# Patient Record
Sex: FEMALE | Race: BLACK OR AFRICAN AMERICAN | Employment: FULL TIME | URBAN - METROPOLITAN AREA
[De-identification: names, ages, dates, MRNs, and addresses within clinical notes are randomized per-mention and may not be internally consistent; named-entity substitution may affect disease eponyms.]

---

## 2017-11-10 ENCOUNTER — HOSPITAL ENCOUNTER (OUTPATIENT)
Dept: ULTRASOUND IMAGING | Age: 47
Discharge: HOME | End: 2017-11-10
Attending: FAMILY MEDICINE
Payer: COMMERCIAL

## 2017-11-10 DIAGNOSIS — R09.89 BRUIT OF LEFT CAROTID ARTERY: ICD-10-CM

## 2017-11-10 DIAGNOSIS — R09.89 ABNORMAL CHEST SOUNDS: ICD-10-CM

## 2017-11-10 PROCEDURE — 93880 EXTRACRANIAL BILAT STUDY: CPT

## 2017-11-18 ENCOUNTER — HOSPITAL ENCOUNTER (OUTPATIENT)
Dept: MRI IMAGING | Age: 47
Discharge: HOME | End: 2017-11-18
Attending: OTOLARYNGOLOGY
Payer: COMMERCIAL

## 2017-11-18 DIAGNOSIS — H93.A2 PULSATILE TINNITUS, LEFT EAR: ICD-10-CM

## 2017-11-18 PROCEDURE — 70553 MRI BRAIN STEM W/O & W/DYE: CPT

## 2017-11-18 PROCEDURE — A9576 INJ PROHANCE MULTIPACK: HCPCS | Performed by: OTOLARYNGOLOGY

## 2017-11-18 PROCEDURE — 74011250636 HC RX REV CODE- 250/636: Performed by: OTOLARYNGOLOGY

## 2017-11-18 RX ADMIN — GADOTERIDOL 20 ML: 279.3 INJECTION, SOLUTION INTRAVENOUS at 13:00

## 2019-05-03 ENCOUNTER — OFFICE VISIT (OUTPATIENT)
Dept: NEUROLOGY | Age: 49
End: 2019-05-03

## 2019-05-03 VITALS
SYSTOLIC BLOOD PRESSURE: 138 MMHG | RESPIRATION RATE: 18 BRPM | WEIGHT: 175 LBS | OXYGEN SATURATION: 99 % | DIASTOLIC BLOOD PRESSURE: 86 MMHG | HEART RATE: 65 BPM

## 2019-05-03 DIAGNOSIS — E03.9 HYPOTHYROIDISM, UNSPECIFIED TYPE: ICD-10-CM

## 2019-05-03 DIAGNOSIS — R53.83 FATIGUE, UNSPECIFIED TYPE: ICD-10-CM

## 2019-05-03 DIAGNOSIS — R51.9 NONINTRACTABLE EPISODIC HEADACHE, UNSPECIFIED HEADACHE TYPE: ICD-10-CM

## 2019-05-03 DIAGNOSIS — R20.2 PARESTHESIA: Primary | ICD-10-CM

## 2019-05-03 RX ORDER — LEVOTHYROXINE SODIUM 125 UG/1
TABLET ORAL
COMMUNITY
End: 2022-09-05 | Stop reason: DRUGHIGH

## 2019-05-03 NOTE — PROGRESS NOTES
Neurology Consult Note      HISTORY PROVIDED BY: patient and     Chief Complaint:   Chief Complaint   Patient presents with    Neurologic Problem      Subjective:    Billy Ross is a 51 y.o. right handed female who presents in consultation for paresthesias. Pt reports onset one week ago of pins and needles in right arm mostly, occasionally left arm. This occurs randomly in different spots on arms, not entire arm, intermittent. When questioned, states she has a little neck pain. Denies any inciting events. Then 4 days ago began having pins and needles in all extremities, again intermittent in various spots, not on trunk. Did start working out with a punching bag that day, but doesn't feel that this is cause. Additionally, began having pain in back of head one month ago, now global. She went to [facility] ED at onset and had head CT that patient reports was normal. No prior h/o HAs. Occurring daily, intermittent, brief stabbing pain, not taking anything for it now, given pain med in ED. She has tinnitus in left ear, not pulsatile, was seen at [facility] and no cause found. She is feeling more fatigued recently. Sleeping well, but does not feel refreshed on awakening, no snoring, sleeps through the night. She has not seen PCP for these issues yet. Note from referring provider reviewed: Adriana Montoya, MAIA at [facility], [address] [place] [address] Rd 5/2/19 -complained of pins-and-needles sensation to both arms from the shoulder fingers as well as both legs to feet, noted legs worse when lying down. She reported having sensations in the past with it, but have become noticeable again particularly over the last week. Mentions being seen in the emergency department March 2019 for pains to the back of the head diagnosed with muscle spasms. Had negative head CT. Mentions she was seen at this clinic in 2016 with similar concerns not responding to diclofenac.   Also mentions seeing Dr. Werner Bernard in ENT for left ear tinnitus with negative CT and MRI. Also mentions carotid plaque on Dopplers in 2017 prescribed pravastatin but not currently taking. Most recent labs were from 2017. They recommend a neurological evaluation. Past Medical History:   Diagnosis Date    Hypothyroid       History reviewed. No pertinent surgical history.    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: Not on file    Food insecurity:     Worry: Not on file     Inability: Not on file   Cytoguide needs:     Medical: Not on file     Non-medical: Not on file   Tobacco Use    Smoking status: Never Smoker    Smokeless tobacco: Never Used   Substance and Sexual Activity    Alcohol use: Never     Frequency: Never    Drug use: Never    Sexual activity: Not on file   Lifestyle    Physical activity:     Days per week: Not on file     Minutes per session: Not on file    Stress: Not on file   Relationships    Social connections:     Talks on phone: Not on file     Gets together: Not on file     Attends Latter-day service: Not on file     Active member of club or organization: Not on file     Attends meetings of clubs or organizations: Not on file     Relationship status: Not on file    Intimate partner violence:     Fear of current or ex partner: Not on file     Emotionally abused: Not on file     Physically abused: Not on file     Forced sexual activity: Not on file   Other Topics Concern    Not on file   Social History Narrative    , lives in [place] with      Family History   Problem Relation Age of Onset    High Cholesterol Mother     Hypertension Father     No Known Problems Sister     No Known Problems Daughter     No Known Problems Daughter Objective:   Review of Systems   Constitutional: Positive for malaise/fatigue. HENT: Positive for tinnitus. Eyes: Negative. Respiratory: Negative. Cardiovascular: Negative. Gastrointestinal: Negative. Genitourinary: Negative. Musculoskeletal: Negative. Skin: Negative. Neurological: Negative. Endo/Heme/Allergies: Negative. Psychiatric/Behavioral: Negative. No Known Allergies     Meds:  Outpatient Medications Prior to Visit   Medication Sig Dispense Refill    levothyroxine (SYNTHROID) 125 mcg tablet Take  by mouth Daily (before breakfast). No facility-administered medications prior to visit. Imaging:  MRI Results (most recent):  No results found for this or any previous visit. CT Results (most recent):  No results found for this or any previous visit. Reviewed records in [email or web address] and [facility] tab today    Lab Review   No results found for this or any previous visit. Exam:  Visit Vitals  /86   Pulse 65   Resp 18   Wt 79.4 kg (175 lb)   SpO2 99%     General:  Alert, cooperative, no distress. Head:  Normocephalic, without obvious abnormality, atraumatic. Respiratory:  Heart:   Non labored breathing  Regular rate and rhythm, no murmurs   Neck:   2+ carotids, no bruits   Extremities: Warm, no cyanosis or edema. Pulses: 2+ radial pulses. Neurologic:  MS: Alert and oriented x 4, speech intact. Language intact. Attention and fund of knowledge appropriate. Recent and remote memory intact.   Cranial Nerves:  II: visual fields Full to confrontation   II: pupils Equal, round, reactive to light   II: optic disc No papilledema   III,VII: ptosis none   III,IV,VI: extraocular muscles  EOMI, no nystagmus or diplopia   V: facial light touch sensation  normal   VII: facial muscle function   symmetric   VIII: hearing intact   IX: soft palate elevation  normal   XI: trapezius strength  5/5   XI: sternocleidomastoid strength 5/5   XII: tongue  Midline Motor: normal bulk and tone, no tremor              Strength: 5/5 throughout, no PD  Sensory: intact to LT, PP  Coordination: FTN and HTS intact, EDITH intact  Gait: normal gait, able to tandem walk  Reflexes: 2+ symmetric, toes downgoing           Assessment/Plan   Pt is a 51 y.o. right handed female with one week history of intermittent migrating pins and needles sensation in her extremities, initially in right arm, but progressed to left arm and 4 days ago began in all extremities. (History is different from that provided to referring provider yesterday.)  Additionally, began having pain in back of head one month ago, now global, daily intermittent brief stabbing pain. Reportedly had a negative head CT at [facility]. C/o left tinnitus, with referring provider notes mentioning extensive work up with Dr. Monica Odonnell in ENT including CT and MRI, information not shared by pt. Pt also c/o fatigue. Exam is non-focal and unremarkable with normal sensory exam and reflexes. Discussed the difficulty in localizing her diffuse migrating paresthesias. Low suspicion for any concerning neurological etiology. Recommend laboratory evaluation for potential metabolic causes of her paresthesias and possibly her fatigue including CMP, CBC with differential, TSH, vitamin D, B12/MMA, and hemoglobin A1c. Headaches are not suggestive of any concerning etiology, there are no concerning findings on exam and patient with reported normal head imaging recently. Will request copy of the MRI from Dr. Mar Soria. Recommend follow-up in clinic in 3 months with the NP to reassess for any signs of more concerning etiology for her symptoms. We will contact the patient by phone with results of her labs. ICD-10-CM ICD-9-CM    1. Paresthesia Q54.0 400.0 METABOLIC PANEL, COMPREHENSIVE      CBC WITH AUTOMATED DIFF      VITAMIN D, 25 HYDROXY      TSH 3RD GENERATION      VITAMIN B12      METHYLMALONIC ACID      HEMOGLOBIN A1C WITH EAG   2. Nonintractable episodic headache, unspecified headache type R51 784.0    3. Hypothyroidism, unspecified type E03.9 244.9 TSH 3RD GENERATION   4. Fatigue, unspecified type V69.03 850.16 METABOLIC PANEL, COMPREHENSIVE      CBC WITH AUTOMATED DIFF      VITAMIN D, 25 HYDROXY      TSH 3RD GENERATION      VITAMIN B12      METHYLMALONIC ACID      HEMOGLOBIN A1C WITH EAG       Signed:   Rebeca Fang MD  5/3/2019

## 2019-05-03 NOTE — PATIENT INSTRUCTIONS
A Healthy Lifestyle: Care Instructions  Your Care Instructions    A healthy lifestyle can help you feel good, stay at a healthy weight, and have plenty of energy for both work and play. A healthy lifestyle is something you can share with your whole family. A healthy lifestyle also can lower your risk for serious health problems, such as high blood pressure, heart disease, and diabetes. You can follow a few steps listed below to improve your health and the health of your family. Follow-up care is a key part of your treatment and safety. Be sure to make and go to all appointments, and call your doctor if you are having problems. It's also a good idea to know your test results and keep a list of the medicines you take. How can you care for yourself at home? · Do not eat too much sugar, fat, or fast foods. You can still have dessert and treats now and then. The goal is moderation. · Start small to improve your eating habits. Pay attention to portion sizes, drink less juice and soda pop, and eat more fruits and vegetables. ? Eat a healthy amount of food. A 3-ounce serving of meat, for example, is about the size of a deck of cards. Fill the rest of your plate with vegetables and whole grains. ? Limit the amount of soda and sports drinks you have every day. Drink more water when you are thirsty. ? Eat at least 5 servings of fruits and vegetables every day. It may seem like a lot, but it is not hard to reach this goal. A serving or helping is 1 piece of fruit, 1 cup of vegetables, or 2 cups of leafy, raw vegetables. Have an apple or some carrot sticks as an afternoon snack instead of a candy bar. Try to have fruits and/or vegetables at every meal.  · Make exercise part of your daily routine. You may want to start with simple activities, such as walking, bicycling, or slow swimming. Try to be active 30 to 60 minutes every day. You do not need to do all 30 to 60 minutes all at once.  For example, you can exercise 3 times a day for 10 or 20 minutes. Moderate exercise is safe for most people, but it is always a good idea to talk to your doctor before starting an exercise program.  · Keep moving. [place] the lawn, work in the garden, or [employer]. Take the stairs instead of the elevator at work. · If you smoke, quit. People who smoke have an increased risk for heart attack, stroke, cancer, and other lung illnesses. Quitting is hard, but there are ways to boost your chance of quitting tobacco for good. ? Use nicotine gum, patches, or lozenges. ? Ask your doctor about stop-smoking programs and medicines. ? Keep trying. In addition to reducing your risk of diseases in the future, you will notice some benefits soon after you stop using tobacco. If you have shortness of breath or asthma symptoms, they will likely get better within a few weeks after you quit. · Limit how much alcohol you drink. Moderate amounts of alcohol (up to 2 drinks a day for men, 1 drink a day for women) are okay. But drinking too much can lead to liver problems, high blood pressure, and other health problems. Family health  If you have a family, there are many things you can do together to improve your health. · Eat meals together as a family as often as possible. · Eat healthy foods. This includes fruits, vegetables, lean meats and dairy, and whole grains. · Include your family in your fitness plan. Most people think of activities such as jogging or tennis as the way to fitness, but there are many ways you and your family can be more active. Anything that makes you breathe hard and gets your heart pumping is exercise. Here are some tips:  ? Walk to do errands or to take your child to school or the bus.  ? Go for a family bike ride after dinner instead of watching TV. Where can you learn more? Go to http://basilio-thao.info/. Enter H992 in the search box to learn more about \"A Healthy Lifestyle: Care Instructions. \"  Current as of: September 11, 2018  Content Version: 11.9  © 9866-2894 [employer]. Care instructions adapted under license by [employer] (which disclaims liability or warranty for this information). If you have questions about a medical condition or this instruction, always ask your healthcare professional. [address] any warranty or liability for your use of this information.

## 2019-05-06 LAB
25(OH)D3+25(OH)D2 SERPL-MCNC: 10.4 NG/ML (ref 30–100)
ALBUMIN SERPL-MCNC: 4.2 G/DL (ref 3.5–5.5)
ALBUMIN/GLOB SERPL: 1.4 {RATIO} (ref 1.2–2.2)
ALP SERPL-CCNC: 118 IU/L (ref 39–117)
ALT SERPL-CCNC: 14 IU/L (ref 0–32)
AST SERPL-CCNC: 17 IU/L (ref 0–40)
BASOPHILS # BLD AUTO: 0 X10E3/UL (ref 0–0.2)
BASOPHILS NFR BLD AUTO: 1 %
BILIRUB SERPL-MCNC: 0.4 MG/DL (ref 0–1.2)
BUN SERPL-MCNC: 9 MG/DL (ref 6–24)
BUN/CREAT SERPL: 11 (ref 9–23)
CALCIUM SERPL-MCNC: 9.4 MG/DL (ref 8.7–10.2)
CHLORIDE SERPL-SCNC: 103 MMOL/L (ref 96–106)
CO2 SERPL-SCNC: 25 MMOL/L (ref 20–29)
CREAT SERPL-MCNC: 0.81 MG/DL (ref 0.57–1)
EOSINOPHIL # BLD AUTO: 0.1 X10E3/UL (ref 0–0.4)
EOSINOPHIL NFR BLD AUTO: 3 %
ERYTHROCYTE [DISTWIDTH] IN BLOOD BY AUTOMATED COUNT: 15.7 % (ref 12.3–15.4)
EST. AVERAGE GLUCOSE BLD GHB EST-MCNC: 114 MG/DL
GLOBULIN SER CALC-MCNC: 3 G/DL (ref 1.5–4.5)
GLUCOSE SERPL-MCNC: 75 MG/DL (ref 65–99)
HBA1C MFR BLD: 5.6 % (ref 4.8–5.6)
HCT VFR BLD AUTO: 39 % (ref 34–46.6)
HGB BLD-MCNC: 12.6 G/DL (ref 11.1–15.9)
IMM GRANULOCYTES # BLD AUTO: 0 X10E3/UL (ref 0–0.1)
IMM GRANULOCYTES NFR BLD AUTO: 0 %
LYMPHOCYTES # BLD AUTO: 2.4 X10E3/UL (ref 0.7–3.1)
LYMPHOCYTES NFR BLD AUTO: 58 %
Lab: NORMAL
MCH RBC QN AUTO: 26.8 PG (ref 26.6–33)
MCHC RBC AUTO-ENTMCNC: 32.3 G/DL (ref 31.5–35.7)
MCV RBC AUTO: 83 FL (ref 79–97)
METHYLMALONATE SERPL-SCNC: 68 NMOL/L (ref 0–378)
MONOCYTES # BLD AUTO: 0.2 X10E3/UL (ref 0.1–0.9)
MONOCYTES NFR BLD AUTO: 5 %
NEUTROPHILS # BLD AUTO: 1.4 X10E3/UL (ref 1.4–7)
NEUTROPHILS NFR BLD AUTO: 33 %
PLATELET # BLD AUTO: 299 X10E3/UL (ref 150–379)
POTASSIUM SERPL-SCNC: 4 MMOL/L (ref 3.5–5.2)
PROT SERPL-MCNC: 7.2 G/DL (ref 6–8.5)
RBC # BLD AUTO: 4.71 X10E6/UL (ref 3.77–5.28)
SODIUM SERPL-SCNC: 140 MMOL/L (ref 134–144)
TSH SERPL DL<=0.005 MIU/L-ACNC: 0.28 UIU/ML (ref 0.45–4.5)
VIT B12 SERPL-MCNC: 640 PG/ML (ref 232–1245)
WBC # BLD AUTO: 4.2 X10E3/UL (ref 3.4–10.8)

## 2019-05-07 ENCOUNTER — TELEPHONE (OUTPATIENT)
Dept: NEUROLOGY | Age: 49
End: 2019-05-07

## 2019-05-07 RX ORDER — ERGOCALCIFEROL 1.25 MG/1
50000 CAPSULE ORAL
Qty: 12 CAP | Refills: 0 | Status: SHIPPED | OUTPATIENT
Start: 2019-05-07 | End: 2021-10-30

## 2019-05-07 NOTE — TELEPHONE ENCOUNTER
Pt calling with blood work results. She is also still having pain in arms and in the back of her head.  Please call back

## 2019-05-07 NOTE — TELEPHONE ENCOUNTER
Spoke with patient about labs per Dr Terri Saenz, Updated record with correct pharmacy information and send rx to pharmacy. Signed request for imaging faxed to [address] [place] [address] for MRI results.

## 2019-05-07 NOTE — TELEPHONE ENCOUNTER
Josephine Cohen - Please call pt: Her vit D level is severely low. I have printed an Rx (no one put a pharmacy in for the pt at her visit) for high dose vit D, 50,000units once a week for 12 weeks, then we need to recheck level. As we discussed at her appt, vit D deficiency can cause paresthesias and can cause muscle pain. TSH was low which may mean her dose of synthroid is too high, will defer adjustment of dose to PCP - please send a copy of labs to Dr. Venus Pappas. Other labs - CBC with diff, CMP, B12/MMA, HgbA1C were normal.   I understand she had an MRI brain through Dr. Ortiz Friends office and I would like a copy of that MRI.  (Please make certain this was requested.)

## 2021-09-28 ENCOUNTER — APPOINTMENT (OUTPATIENT)
Dept: GENERAL RADIOLOGY | Age: 51
End: 2021-09-28
Attending: EMERGENCY MEDICINE
Payer: COMMERCIAL

## 2021-09-28 ENCOUNTER — HOSPITAL ENCOUNTER (EMERGENCY)
Dept: MRI IMAGING | Age: 51
Discharge: HOME | End: 2021-09-28
Attending: EMERGENCY MEDICINE
Payer: COMMERCIAL

## 2021-09-28 ENCOUNTER — APPOINTMENT (OUTPATIENT)
Dept: CT IMAGING | Age: 51
End: 2021-09-28
Attending: EMERGENCY MEDICINE
Payer: COMMERCIAL

## 2021-09-28 ENCOUNTER — HOSPITAL ENCOUNTER (EMERGENCY)
Age: 51
Discharge: HOME | End: 2021-09-28
Attending: EMERGENCY MEDICINE
Payer: COMMERCIAL

## 2021-09-28 VITALS
DIASTOLIC BLOOD PRESSURE: 91 MMHG | RESPIRATION RATE: 14 BRPM | TEMPERATURE: 97.9 F | HEIGHT: 62 IN | OXYGEN SATURATION: 100 % | WEIGHT: 181.22 LBS | HEART RATE: 60 BPM | SYSTOLIC BLOOD PRESSURE: 152 MMHG | BODY MASS INDEX: 33.35 KG/M2

## 2021-09-28 VITALS — BODY MASS INDEX: 33.15 KG/M2 | WEIGHT: 181.22 LBS

## 2021-09-28 DIAGNOSIS — R20.0 ARM NUMBNESS: Primary | ICD-10-CM

## 2021-09-28 LAB
ALBUMIN SERPL-MCNC: 3.5 G/DL (ref 3.5–5)
ALBUMIN/GLOB SERPL: 0.9 {RATIO} (ref 1.1–2.2)
ALP SERPL-CCNC: 159 U/L (ref 45–117)
ALT SERPL-CCNC: 25 U/L (ref 12–78)
ANION GAP SERPL CALC-SCNC: 9 MMOL/L (ref 5–15)
APTT PPP: 24.5 SEC (ref 22.1–31)
AST SERPL-CCNC: 21 U/L (ref 15–37)
ATRIAL RATE: 60 BPM
BASOPHILS # BLD: 0 K/UL (ref 0–0.1)
BASOPHILS NFR BLD: 1 % (ref 0–1)
BILIRUB SERPL-MCNC: 0.4 MG/DL (ref 0.2–1)
BNP SERPL-MCNC: 33 PG/ML (ref 0–125)
BUN SERPL-MCNC: 13 MG/DL (ref 6–20)
BUN/CREAT SERPL: 15 (ref 12–20)
CALCIUM SERPL-MCNC: 8.9 MG/DL (ref 8.5–10.1)
CALCULATED P AXIS, ECG09: 58 DEGREES
CALCULATED R AXIS, ECG10: 7 DEGREES
CALCULATED T AXIS, ECG11: 20 DEGREES
CHLORIDE SERPL-SCNC: 108 MMOL/L (ref 97–108)
CO2 SERPL-SCNC: 26 MMOL/L (ref 21–32)
CREAT SERPL-MCNC: 0.87 MG/DL (ref 0.55–1.02)
CRP SERPL-MCNC: <0.29 MG/DL
DIAGNOSIS, 93000: NORMAL
DIFFERENTIAL METHOD BLD: ABNORMAL
EOSINOPHIL # BLD: 0.1 K/UL (ref 0–0.4)
EOSINOPHIL NFR BLD: 1 % (ref 0–7)
ERYTHROCYTE [DISTWIDTH] IN BLOOD BY AUTOMATED COUNT: 14.1 % (ref 11.5–14.5)
ERYTHROCYTE [SEDIMENTATION RATE] IN BLOOD: 18 MM/HR (ref 0–30)
GLOBULIN SER CALC-MCNC: 4 G/DL (ref 2–4)
GLUCOSE SERPL-MCNC: 90 MG/DL (ref 65–100)
HCT VFR BLD AUTO: 38.1 % (ref 35–47)
HGB BLD-MCNC: 12 G/DL (ref 11.5–16)
IMM GRANULOCYTES # BLD AUTO: 0 K/UL (ref 0–0.04)
IMM GRANULOCYTES NFR BLD AUTO: 0 % (ref 0–0.5)
INR PPP: 0.9 (ref 0.9–1.1)
LYMPHOCYTES # BLD: 2.1 K/UL (ref 0.8–3.5)
LYMPHOCYTES NFR BLD: 51 % (ref 12–49)
MCH RBC QN AUTO: 25.4 PG (ref 26–34)
MCHC RBC AUTO-ENTMCNC: 31.5 G/DL (ref 30–36.5)
MCV RBC AUTO: 80.7 FL (ref 80–99)
MONOCYTES # BLD: 0.3 K/UL (ref 0–1)
MONOCYTES NFR BLD: 7 % (ref 5–13)
NEUTS SEG # BLD: 1.7 K/UL (ref 1.8–8)
NEUTS SEG NFR BLD: 40 % (ref 32–75)
NRBC # BLD: 0 K/UL (ref 0–0.01)
NRBC BLD-RTO: 0 PER 100 WBC
P-R INTERVAL, ECG05: 158 MS
PLATELET # BLD AUTO: 287 K/UL (ref 150–400)
PMV BLD AUTO: 10 FL (ref 8.9–12.9)
POTASSIUM SERPL-SCNC: 4 MMOL/L (ref 3.5–5.1)
PROT SERPL-MCNC: 7.5 G/DL (ref 6.4–8.2)
PROTHROMBIN TIME: 9.3 SEC (ref 9–11.1)
Q-T INTERVAL, ECG07: 404 MS
QRS DURATION, ECG06: 70 MS
QTC CALCULATION (BEZET), ECG08: 404 MS
RBC # BLD AUTO: 4.72 M/UL (ref 3.8–5.2)
SODIUM SERPL-SCNC: 143 MMOL/L (ref 136–145)
THERAPEUTIC RANGE,PTTT: NORMAL SECS (ref 58–77)
TROPONIN I SERPL-MCNC: <0.05 NG/ML
VENTRICULAR RATE, ECG03: 60 BPM
WBC # BLD AUTO: 4.2 K/UL (ref 3.6–11)

## 2021-09-28 PROCEDURE — 74011250636 HC RX REV CODE- 250/636: Performed by: EMERGENCY MEDICINE

## 2021-09-28 PROCEDURE — 74011000636 HC RX REV CODE- 636: Performed by: EMERGENCY MEDICINE

## 2021-09-28 PROCEDURE — 70496 CT ANGIOGRAPHY HEAD: CPT

## 2021-09-28 PROCEDURE — A9576 INJ PROHANCE MULTIPACK: HCPCS | Performed by: EMERGENCY MEDICINE

## 2021-09-28 PROCEDURE — 85610 PROTHROMBIN TIME: CPT

## 2021-09-28 PROCEDURE — 70553 MRI BRAIN STEM W/O & W/DYE: CPT

## 2021-09-28 PROCEDURE — 85025 COMPLETE CBC W/AUTO DIFF WBC: CPT

## 2021-09-28 PROCEDURE — 85730 THROMBOPLASTIN TIME PARTIAL: CPT

## 2021-09-28 PROCEDURE — 71045 X-RAY EXAM CHEST 1 VIEW: CPT

## 2021-09-28 PROCEDURE — 83880 ASSAY OF NATRIURETIC PEPTIDE: CPT

## 2021-09-28 PROCEDURE — 70450 CT HEAD/BRAIN W/O DYE: CPT

## 2021-09-28 PROCEDURE — 74011250637 HC RX REV CODE- 250/637: Performed by: EMERGENCY MEDICINE

## 2021-09-28 PROCEDURE — 85652 RBC SED RATE AUTOMATED: CPT

## 2021-09-28 PROCEDURE — 93005 ELECTROCARDIOGRAM TRACING: CPT

## 2021-09-28 PROCEDURE — 72156 MRI NECK SPINE W/O & W/DYE: CPT

## 2021-09-28 PROCEDURE — 84484 ASSAY OF TROPONIN QUANT: CPT

## 2021-09-28 PROCEDURE — 99284 EMERGENCY DEPT VISIT MOD MDM: CPT

## 2021-09-28 PROCEDURE — 36415 COLL VENOUS BLD VENIPUNCTURE: CPT

## 2021-09-28 PROCEDURE — 80053 COMPREHEN METABOLIC PANEL: CPT

## 2021-09-28 PROCEDURE — 86140 C-REACTIVE PROTEIN: CPT

## 2021-09-28 RX ORDER — ASPIRIN 325 MG
325 TABLET ORAL
Status: COMPLETED | OUTPATIENT
Start: 2021-09-28 | End: 2021-09-28

## 2021-09-28 RX ORDER — GUAIFENESIN 100 MG/5ML
162 LIQUID (ML) ORAL DAILY
Qty: 60 TABLET | Refills: 0 | Status: SHIPPED | OUTPATIENT
Start: 2021-09-28 | End: 2022-09-01 | Stop reason: ALTCHOICE

## 2021-09-28 RX ADMIN — IOPAMIDOL 100 ML: 755 INJECTION, SOLUTION INTRAVENOUS at 09:17

## 2021-09-28 RX ADMIN — ASPIRIN 325 MG ORAL TABLET 325 MG: 325 PILL ORAL at 13:57

## 2021-09-28 RX ADMIN — GADOTERIDOL 15 ML: 279.3 INJECTION, SOLUTION INTRAVENOUS at 12:22

## 2021-09-28 NOTE — ED PROVIDER NOTES
HPI   The patient is a 50-year-old black female presents to the emergency room with acute onset of 2 days of left facial numbness and some mild weakness in the left arm. She also has sharp electric shock pains in her left head which last a second or 2. She had similar episodes approximately 2 years prior to admission and saw Dr. Barbra Llamas in neurology. She had a negative MRI of the brain with and without and 17. She denies hormones or any other birth control pills etc.  Past Medical History:   Diagnosis Date    Hypothyroid        History reviewed. No pertinent surgical history. 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    Smoking status: Never Smoker    Smokeless tobacco: Never Used   Substance and Sexual Activity    Alcohol use: Never    Drug use: Never    Sexual activity: Not on file   Other Topics Concern    Not on file   Social History Narrative    , lives in [place] with      Social Determinants of Health     Financial Resource Strain:     Difficulty of Paying Living Expenses:    Food Insecurity:     Worried About Running Out of Food in the Last Year:     [address] in the Last Year:    Transportation Needs:     Lack of Transportation (Medical):      Lack of Transportation (Non-Medical):    Physical Activity:     Days of Exercise per Week:     Minutes of Exercise per Session:    Stress:     Feeling of Stress :    Social Connections:     Frequency of Communication with Friends and Family:     Frequency of Social Gatherings with Friends and Family:     Attends Congregational Services:     Active Member of Clubs or Organizations:     Attends Club or Organization Meetings:     Marital Status:    Intimate Partner Violence:     Fear of Current or Ex-Partner:     Emotionally Abused:     Physically Abused:     Sexually Abused: ALLERGIES: Patient has no known allergies. Review of Systems   All other systems reviewed and are negative. Vitals:    09/28/21 0730   BP: (!) 152/91   Pulse: 60   Resp: 14   Temp: 97.9 °F (36.6 °C)   SpO2: 98%   Weight: 82.2 kg (181 lb 3.5 oz)   Height: 5' 2\" (1.575 m)            Physical Exam  Vitals and nursing note reviewed. Constitutional:       Appearance: She is well-developed. HENT:      Head: Normocephalic and atraumatic. Mouth/Throat:      Pharynx: No oropharyngeal exudate. Eyes:      General: No scleral icterus. Conjunctiva/sclera: Conjunctivae normal.   Neck:      Thyroid: No thyromegaly. Cardiovascular:      Rate and Rhythm: Normal rate and regular rhythm. Heart sounds: Normal heart sounds. No murmur heard. No friction rub. No gallop. Pulmonary:      Effort: Pulmonary effort is normal. No respiratory distress. Breath sounds: Normal breath sounds. No stridor. No wheezing or rales. Abdominal:      General: Bowel sounds are normal.      Palpations: Abdomen is soft. Tenderness: There is no abdominal tenderness. There is no guarding or rebound. Musculoskeletal:         General: Normal range of motion. Cervical back: Neck supple. Lymphadenopathy:      Cervical: No cervical adenopathy. Skin:     General: Skin is warm and dry. Neurological:      Mental Status: She is alert and oriented to person, place, and time.       Comments: Mild numbness left arm no other focal neuro deficits no pronator drift finger-to-nose normal speech normal          MDM  Number of Diagnoses or Management Options     Amount and/or Complexity of Data Reviewed  Clinical lab tests: ordered and reviewed  Tests in the radiology section of CPT®: ordered and reviewed  Tests in the medicine section of CPT®: ordered and reviewed    Risk of Complications, Morbidity, and/or Mortality  Presenting problems: moderate  Diagnostic procedures: moderate  Management options: moderate           Procedures        Assessment and plan the patient was seen by neuro telemetry Dr. Zoë Da Silva who recommended we do full work-up in the ED including MRI of the brain and neck both with and without contrast.  All the above studies were essentially normal except from cervical spondylosis we will discharge patient home with close follow-up on 2 baby aspirin per day.

## 2021-09-28 NOTE — ED TRIAGE NOTES
Left sided facial and extremity numbness for 2 days. Also reports headache and slight left ext weakness.

## 2021-09-28 NOTE — ED NOTES
Discharge instructions reviewed with pt and copy given along with RX by this RN. Patient educated on follow up with PCP and is ambulatory from ED in no sign of distress or discomfort.
Tele Neuro paged at this time per MD request
Neurology

## 2021-10-30 ENCOUNTER — HOSPITAL ENCOUNTER (EMERGENCY)
Age: 51
Discharge: HOME | End: 2021-10-30
Attending: EMERGENCY MEDICINE
Payer: COMMERCIAL

## 2021-10-30 VITALS
DIASTOLIC BLOOD PRESSURE: 71 MMHG | SYSTOLIC BLOOD PRESSURE: 141 MMHG | BODY MASS INDEX: 29.9 KG/M2 | TEMPERATURE: 98.2 F | HEIGHT: 65 IN | RESPIRATION RATE: 16 BRPM | WEIGHT: 179.45 LBS | OXYGEN SATURATION: 100 % | HEART RATE: 74 BPM

## 2021-10-30 DIAGNOSIS — H11.32 SUBCONJUNCTIVAL HEMORRHAGE OF LEFT EYE: Primary | ICD-10-CM

## 2021-10-30 PROCEDURE — 99283 EMERGENCY DEPT VISIT LOW MDM: CPT

## 2021-10-30 RX ORDER — LANOLIN ALCOHOL/MO/W.PET/CERES
1000 CREAM (GRAM) TOPICAL DAILY
COMMUNITY

## 2021-10-30 NOTE — ED TRIAGE NOTES
Pt. Complains of right foot numbness/tingling in right foot and has a broken vessel in eye and was concerned about b/p  151/72. This is not a new problem and has been going on/off for years. Has an appointment with Carissa Sethi but it isn't until February but has seen her before for the same.

## 2021-10-31 NOTE — ED PROVIDER NOTES
Patient is a 42-year-old female with history of hypothyroidism who presents with redness in left eye that she noticed today. Patient denies any changes in vision, pain. Denies any blood thinner use or trauma. Reports that she still has ongoing numbness since the last time she was evaluated in the ED. At that time advanced imaging and telemetry neurology evaluation was negative for stroke and she was told to follow-up with neurology as an outpatient. Denies headache, nausea, vomiting. Past Medical History:   Diagnosis Date    Hypothyroid        No past surgical history on file. 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    Smoking status: Never Smoker    Smokeless tobacco: Never Used   Substance and Sexual Activity    Alcohol use: Never    Drug use: Never    Sexual activity: Not on file   Other Topics Concern    Not on file   Social History Narrative    , lives in [place] with      Social Determinants of Health     Financial Resource Strain:     Difficulty of Paying Living Expenses:    Food Insecurity:     Worried About Running Out of Food in the Last Year:     [address] in the Last Year:    Transportation Needs:     Lack of Transportation (Medical):      Lack of Transportation (Non-Medical):    Physical Activity:     Days of Exercise per Week:     Minutes of Exercise per Session:    Stress:     Feeling of Stress :    Social Connections:     Frequency of Communication with Friends and Family:     Frequency of Social Gatherings with Friends and Family:     Attends Jain Services:     Active Member of Clubs or Organizations:     Attends Club or Organization Meetings:     Marital Status:    Intimate Partner Violence:     Fear of Current or Ex-Partner:     Emotionally Abused:     Physically Abused:     Sexually Abused: ALLERGIES: Patient has no known allergies. Review of Systems   Constitutional: Negative for chills and fever. HENT: Negative for drooling and nosebleeds. Eyes: Negative for photophobia, pain, discharge, itching and visual disturbance. Respiratory: Negative for choking and stridor. Cardiovascular: Negative for leg swelling. Gastrointestinal: Negative for abdominal distention and rectal pain. Endocrine: Negative for heat intolerance and polyphagia. Genitourinary: Negative for enuresis and genital sores. Musculoskeletal: Negative for arthralgias and joint swelling. Skin: Negative for color change. Allergic/Immunologic: Negative for immunocompromised state. Neurological: Negative for tremors and speech difficulty. Hematological: Negative for adenopathy. Psychiatric/Behavioral: Negative for dysphoric mood and sleep disturbance. Vitals:    10/30/21 1926 10/30/21 2015 10/30/21 2100   BP: (!) 151/72 127/79 (!) 141/71   Pulse: 74     Resp: 16     Temp: 98.2 °F (36.8 °C)     SpO2: 99%  100%   Weight: 81.4 kg (179 lb 7.3 oz)     Height: 5' 4.5\" (1.638 m)              Physical Exam  Vitals and nursing note reviewed. Constitutional:       General: She is not in acute distress. Appearance: She is well-developed. She is not ill-appearing, toxic-appearing or diaphoretic. HENT:      Head: Normocephalic and atraumatic. Nose: Nose normal.   Eyes:      Extraocular Movements: Extraocular movements intact. Conjunctiva/sclera: Conjunctivae normal.      Pupils: Pupils are equal, round, and reactive to light.       Comments: Small conjunctival hemorrhage at 3 jessica. Cardiovascular:      Rate and Rhythm: Normal rate and regular rhythm. Heart sounds: Normal heart sounds. Pulmonary:      Effort: Pulmonary effort is normal. No respiratory distress. Breath sounds: Normal breath sounds. Abdominal:      General: There is no distension. Palpations: Abdomen is soft. Musculoskeletal:         General: No deformity. Normal range of motion. Cervical back: Normal range of motion and neck supple. Skin:     General: Skin is warm and dry. Neurological:      Mental Status: She is alert and oriented to person, place, and time. Coordination: Coordination normal.      Gait: Gait normal.   Psychiatric:         Behavior: Behavior normal.          [facility]  ED Course as of Oct 31 0047   Sat Oct 30, 2021   2050 Noticed red spot in lateral left eye this morning. Has used visine but denies any pain, vision changes, foreign body sensation. Will see VEI if no better next week. Reported her numbness is chronic, for which she was evaluated previously in the ER. Still has to see neurology. Stable for dc. [AL]      ED Course User Index  [AL] Ramesh Messina MD       Procedures    Patient's results have been reviewed with them. Patient and/or family have verbally conveyed their understanding and agreement of the patient's signs, symptoms, diagnosis, treatment and prognosis and additionally agree to follow up as recommended or return to the Emergency Room should their condition change prior to follow-up. Discharge instructions have also been provided to the patient with some educational information regarding their diagnosis as well a list of reasons why they would want to return to the ER prior to their follow-up appointment should their condition change.

## 2021-10-31 NOTE — ED NOTES
Pt d/c'd home, no IV. Pt given d/c instructions and verbalized understanding.   Declined w/c and left ambulatory in care of self in stable condition

## 2022-03-08 ENCOUNTER — OFFICE VISIT (OUTPATIENT)
Dept: NEUROLOGY | Age: 52
End: 2022-03-08
Payer: COMMERCIAL

## 2022-03-08 VITALS
HEART RATE: 64 BPM | OXYGEN SATURATION: 100 % | TEMPERATURE: 97.4 F | RESPIRATION RATE: 16 BRPM | WEIGHT: 186.6 LBS | DIASTOLIC BLOOD PRESSURE: 90 MMHG | BODY MASS INDEX: 31.54 KG/M2 | SYSTOLIC BLOOD PRESSURE: 142 MMHG

## 2022-03-08 DIAGNOSIS — R20.2 PARESTHESIA: Primary | ICD-10-CM

## 2022-03-08 DIAGNOSIS — R20.0 NUMBNESS OF FACE: ICD-10-CM

## 2022-03-08 PROCEDURE — 99214 OFFICE O/P EST MOD 30 MIN: CPT | Performed by: PSYCHIATRY & NEUROLOGY

## 2022-03-08 NOTE — PROGRESS NOTES
Chief Complaint   Patient presents with    Follow-up     Numbness on the left side of the face, feet and left arm, and \"electrical\" feelings on the back of the head since November     Visit Vitals  BP (!) 142/90 (BP 1 Location: Right arm, BP Patient Position: Sitting, BP Cuff Size: Large adult)   Pulse 64   Temp 97.4 °F (36.3 °C) (Temporal)   Resp 16   Wt 84.6 kg (186 lb 9.6 oz)   SpO2 100%   BMI 31.54 kg/m²

## 2022-03-08 NOTE — PROGRESS NOTES
Neurology Consult Note      HISTORY PROVIDED BY: patient    Chief Complaint:   Chief Complaint   Patient presents with    Follow-up     Numbness on the left side of the face, feet and left arm, and \"electrical\" feelings on the back of the head since November      Subjective:   Pt is a 50yo. right handed female initially and last seen on 5/3/19 with one week history of intermittent migrating pins and needles sensation in her extremities, initially in right arm, but progressed to left arm and 4 days prior to appt began in all extremities. (History is different from that provided to referring provider yesterday.)  Additionally, began having pain in back of head one month ago, global, daily intermittent brief stabbing pain. Reportedly had a negative head CT at [facility] ED. C/o left tinnitus, with referring provider notes mentioning extensive work up with Dr. Tyrone Lucas in ENT including CT and MRI, information not shared by pt. Pt also c/o fatigue. Exam was non-focal and unremarkable with normal sensory exam and reflexes. Discussed the difficulty in localizing her diffuse migrating paresthesias. Low suspicion for any concerning neurological etiology. Recommended laboratory evaluation for potential metabolic causes of her paresthesias and possibly her fatigue including CMP, CBC with differential, TSH, vitamin D, B12/MMA, and hemoglobin A1c. Headaches were not suggestive of any concerning etiology, there are no concerning findings on exam and patient with reported normal head imaging recently. Requested copy of the MRI from Dr. Starla August. She returns for very delayed f/u. MRI brain w/wo contrast 11/18/17 was normal. MRI brain w/wo contrast 9/28/21 - normal. MRI C-spine w/wo mod NF stenosis at C6-C7 and C4-C5. Her sxs had resolved, but recurred around Sept, 2021. Seen in the ED on 9/28/21 and had MRIs and CTA H/N that were normal. CRP/ESR/CMP/CBC with diff were all unremarkable.  She states that this time was slightly different b/c she had numbness on left face. Her sxs lasted for a few weeks, then resolved. Vit D level was severely low at last visit, which can cause paresthesias and muscle pain. TSH was low as well, deferred adjustment of dose to PCP. Other labs in May, 2019 - CBC with diff, CMP, B12/MMA, HgbA1C were normal.     Past Medical History:   Diagnosis Date    Hypothyroid       No past surgical history on file. 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    Smoking status: Never Smoker    Smokeless tobacco: Never Used   Substance and Sexual Activity    Alcohol use: Never    Drug use: Never    Sexual activity: Not on file   Other Topics Concern    Not on file   Social History Narrative    , lives in [address] with      Social Determinants of Health     Financial Resource Strain:     Difficulty of Paying Living Expenses: Not on file   Food Insecurity:     Worried About [address] in the Last Year: Not on file    [address] in the Last Year: Not on file   Transportation Needs:     Lack of Transportation (Medical): Not on file    Lack of Transportation (Non-Medical):  Not on file   Physical Activity:     Days of Exercise per Week: Not on file    Minutes of Exercise per Session: Not on file   Stress:     Feeling of Stress : Not on file   Social Connections:     Frequency of Communication with Friends and Family: Not on file    Frequency of Social Gatherings with Friends and Family: Not on file    Attends Latter day Services: Not on file    Active Member of Clubs or Organizations: Not on file    Attends Club or Organization Meetings: Not on file    Marital Status: Not on file   Intimate Partner Violence:     Fear of Current or Ex-Partner: Not on file    Emotionally Abused: Not on file    Physically Abused: Not on file    Sexually Abused: Not on file   Housing Stability:     Unable to Pay for Housing in the Last Year: Not on file    Number of Places Lived in the Last Year: Not on file    Unstable Housing in the Last Year: Not on file     Family History   Problem Relation Age of Onset    High Cholesterol Mother     Hypertension Father     No Known Problems Sister     No Known Problems Daughter     No Known Problems Daughter          Objective:   Review of Systems   Constitutional: Positive for malaise/fatigue. HENT: Positive for tinnitus. Eyes: Negative. Respiratory: Negative. Cardiovascular: Negative. Gastrointestinal: Negative. Genitourinary: Negative. Musculoskeletal: Negative. Skin: Negative. Neurological: Negative. Endo/Heme/Allergies: Negative. Psychiatric/Behavioral: Negative. No Known Allergies     Meds:  Outpatient Medications Prior to Visit   Medication Sig Dispense Refill    cyanocobalamin 1,000 mcg tablet Take 1,000 mcg by mouth daily.  aspirin 81 mg chewable tablet Take 2 Tablets by mouth daily. 60 Tablet 0    levothyroxine (SYNTHROID) 125 mcg tablet Take  by mouth Daily (before breakfast). No facility-administered medications prior to visit. Imaging:  MRI Results (most recent):  Results from Hospital Encounter encounter on 09/28/21    MRI CERV SPINE W WO CONT    Narrative  EXAM:  MRI CERV SPINE W WO CONT    INDICATION:   r/o ms    COMPARISON: CTA head and neck 9/28/2021. TECHNIQUE: Multiplanar multisequence acquisition without and with contrast of  the cervical spine. CONTRAST: 15 cc ProHance. FINDINGS:  The cervical cord is normal in size and signal. No evidence of abnormal  enhancement. There is normal alignment of the cervical spine. Vertebral body heights are  maintained without evidence of acute fracture.  There is degenerative endplate  marrow edema at C4-C5 and C6-C7 (Modic type I). Marrow signal is otherwise  normal. Multilevel degenerative changes as detailed below. Region of the foramen  magnum is unremarkable. Visualized soft tissues are unremarkable. C2-C3: No significant disc herniation, spinal canal or neural foraminal  stenosis. C3-C4: Mild diffuse disc osteophyte complex. No significant spinal canal or  neural foraminal stenosis. C4-C5: Diffuse disc osteophyte complex with right-sided uncovertebral spurring. No significant spinal canal stenosis. Moderate right and no left neural  foraminal stenosis. C5-C6: Mild diffuse disc osteophyte complex with left-sided uncovertebral  spurring. No significant spinal canal stenosis. Mild left and no right neural  foraminal stenosis. C6-C7: Diffuse disc osteophyte complex with bilateral uncovertebral spurring. No  significant spinal canal stenosis. Moderate bilateral neural foraminal stenosis. C7-T1: No significant disc herniation, spinal canal or neural foraminal  stenosis. Impression  1. Normal cervical cord. No evidence of abnormal enhancement. 2. Degenerative changes as detailed above, including moderate bilateral neural  foraminal stenosis at C6-C7, and moderate right neural foraminal stenosis at  C4-C5. No significant spinal canal stenosis at any level. CT Results (most recent):  Results from Hospital Encounter encounter on 09/28/21    CTA CODE NEURO HEAD AND NECK W CONT    Narrative  EXAM: CTA CODE NEURO HEAD AND NECK W CONT    INDICATION: cva    COMPARISON: MRI brain November 18, 2017. CONTRAST: 100 mL of Isovue-370. TECHNIQUE:  Unenhanced  images were obtained to localize the volume for  acquisition. Multislice helical axial CT angiography was performed from the  aortic arch to the top of the head during uneventful rapid bolus intravenous  contrast administration. Coronal and sagittal reformations and 3D post  processing was performed.   CT dose reduction was achieved through use of a  standardized protocol tailored for this examination and automatic exposure  control for dose modulation. FINDINGS:    DELAYED ENHANCEMENT HEAD CT  No intra or extra-axial mass or collection. Gray-white differentiation is  preserved. Ventricles are normal in size and configuration. The basal cisterns  are patent. Dural venous sinuses are patent. No abnormal parenchymal or meningeal  enhancement. Mastoid air cells and paranasal sinuses are clear. CTA NECK  There is conventional three vessel arch anatomy. The bilateral subclavian,  common carotid, and internal carotid arteries are patent with no flow-limiting  stenosis. % of right carotid artery stenosis: 0  % of left carotid artery stenosis: 0  Measurements utilize NASCET criteria. NASCET method was utilized for calculating stenosis. The vertebral arteries are codominant and patent. The cervical soft tissues are  unremarkable. There are degenerative changes of the cervical spine. CTA HEAD  Mild atherosclerotic disease of the bilateral intracranial internal carotid  arteries, without hemodynamically significant stenosis. The bilateral middle and  anterior cerebral arteries are patent. The anterior communicating artery is patent. Bilateral V4 segments are patent. The basilar artery is patent. The bilateral  posterior communicating arteries are patent bilaterally. The bilateral P1  segments are widely patent. The posterior cerebral arteries are patent. No intracranial aneurysm. No intracranial hemodynamically significant stenosis. Impression  No acute vascular abnormality, no large vessel occlusion. No hemodynamically  significant stenosis.        Reviewed records in [facility] and [place] tab today    Lab Review   Results for orders placed or performed during the hospital encounter of 09/28/21   CBC WITH AUTOMATED DIFF   Result Value Ref Range    WBC 4.2 3.6 - 11.0 K/uL    RBC 4.72 3.80 - 5.20 M/uL    HGB 12.0 11.5 - 16.0 g/dL    HCT 38.1 35.0 - 47.0 %    MCV 80.7 80.0 - 99.0 FL    MCH 25.4 (L) 26.0 - 34.0 PG    MCHC 31.5 30.0 - 36.5 g/dL    RDW 14.1 11.5 - 14.5 %    PLATELET 971 670 - 882 K/uL    MPV 10.0 8.9 - 12.9 FL    NRBC 0.0 0  WBC    ABSOLUTE NRBC 0.00 0.00 - 0.01 K/uL    NEUTROPHILS 40 32 - 75 %    LYMPHOCYTES 51 (H) 12 - 49 %    MONOCYTES 7 5 - 13 %    EOSINOPHILS 1 0 - 7 %    BASOPHILS 1 0 - 1 %    IMMATURE GRANULOCYTES 0 0.0 - 0.5 %    ABS. NEUTROPHILS 1.7 (L) 1.8 - 8.0 K/UL    ABS. LYMPHOCYTES 2.1 0.8 - 3.5 K/UL    ABS. MONOCYTES 0.3 0.0 - 1.0 K/UL    ABS. EOSINOPHILS 0.1 0.0 - 0.4 K/UL    ABS. BASOPHILS 0.0 0.0 - 0.1 K/UL    ABS. IMM. GRANS. 0.0 0.00 - 0.04 K/UL    DF AUTOMATED     METABOLIC PANEL, COMPREHENSIVE   Result Value Ref Range    Sodium 143 136 - 145 mmol/L    Potassium 4.0 3.5 - 5.1 mmol/L    Chloride 108 97 - 108 mmol/L    CO2 26 21 - 32 mmol/L    Anion gap 9 5 - 15 mmol/L    Glucose 90 65 - 100 mg/dL    BUN 13 6 - 20 MG/DL    Creatinine 0.87 0.55 - 1.02 MG/DL    BUN/Creatinine ratio 15 12 - 20      GFR est AA >60 >60 ml/min/1.73m2    GFR est non-AA >60 >60 ml/min/1.73m2    Calcium 8.9 8.5 - 10.1 MG/DL    Bilirubin, total 0.4 0.2 - 1.0 MG/DL    ALT (SGPT) 25 12 - 78 U/L    AST (SGOT) 21 15 - 37 U/L    Alk.  phosphatase 159 (H) 45 - 117 U/L    Protein, total 7.5 6.4 - 8.2 g/dL    Albumin 3.5 3.5 - 5.0 g/dL    Globulin 4.0 2.0 - 4.0 g/dL    A-G Ratio 0.9 (L) 1.1 - 2.2     TROPONIN I   Result Value Ref Range    Troponin-I, Qt. <0.05 <0.05 ng/mL   NT-PRO BNP   Result Value Ref Range    NT pro-BNP 33 0 - 125 PG/ML   C REACTIVE PROTEIN, QT   Result Value Ref Range    C-Reactive protein <0.29 <0.60 mg/dL   PTT   Result Value Ref Range    aPTT 24.5 22.1 - 31.0 sec    aPTT, therapeutic range     58.0 - 77.0 SECS   PROTHROMBIN TIME + INR   Result Value Ref Range    INR 0.9 0.9 - 1.1      Prothrombin time 9.3 9.0 - 11.1 sec   SED RATE (ESR)   Result Value Ref Range    Sed rate, automated 18 0 - 30 mm/hr   EKG, 12 LEAD, INITIAL   Result Value Ref Range    Ventricular Rate 60 BPM    Atrial Rate 60 BPM    P-R Interval 158 ms    QRS Duration 70 ms    Q-T Interval 404 ms    QTC Calculation (Bezet) 404 ms    Calculated P Axis 58 degrees    Calculated R Axis 7 degrees    Calculated T Axis 20 degrees    Diagnosis       Normal sinus rhythm    No previous ECGs available  Confirmed by Franco Braxton M.D., Alex Garcia ([id number]) on 9/28/2021 3:10:37 PM          Exam:  Visit Vitals  BP (!) 142/90 (BP 1 Location: Right arm, BP Patient Position: Sitting, BP Cuff Size: Large adult)   Pulse 64   Temp 97.4 °F (36.3 °C) (Temporal)   Resp 16   Wt 186 lb 9.6 oz (84.6 kg)   SpO2 100%   BMI 31.54 kg/m²     General:  Alert, cooperative, no distress. Head:  Normocephalic, without obvious abnormality, atraumatic. Respiratory:  Heart:   Non labored breathing  Regular rate and rhythm, no murmurs   Neck:   2+ carotids, no bruits   Extremities: Warm, no cyanosis or edema. Pulses: 2+ radial pulses. Neurologic:  MS: Alert and oriented x 4, speech intact. Language intact. Attention and fund of knowledge appropriate. Recent and remote memory intact. Cranial Nerves:  II: visual fields Full to confrontation   II: pupils Equal, round, reactive to light   II: optic disc    III,VII: ptosis none   III,IV,VI: extraocular muscles  EOMI, no nystagmus or diplopia   V: facial light touch sensation  normal   VII: facial muscle function   symmetric   VIII: hearing intact   IX: soft palate elevation     XI: trapezius strength  5/5   XI: sternocleidomastoid strength 5/5   XII: tongue       Motor: normal bulk and tone, no tremor              Strength: 5/5 throughout, no PD  Sensory: intact to LT, PP  Coordination: FTN and HTS intact, EDITH intact  Gait: normal gait, able to tandem walk  Reflexes: 2+ symmetric, toes downgoing           Assessment/Plan   Pt is a 50yo.  right handed female initially presenting in May, 2019 with one week history of intermittent migrating pins and needles sensation in her extremities, initially in right arm, but progressed to left arm and 4 days prior to appt began in all extremities. Additionally, began having pain in back of head one month prior then global, daily intermittent brief stabbing pain. Reportedly had a negative head CT at [facility] ED and ENT evaluation with CT and MRI that were normal. No concerning neurological etiology found for migrating paresthesia. Was diagnosed with severe Vit D deficiency. Now with recurrent N/T in Sept, 2021 that also involved her face. Seen in ED with MRI brain w/wo contrast 9/28/21 - normal, MRI C-spine w/wo mod NF stenosis at C6-C7 and C4-C5, and unremarkable CTA H/N. Sxs resolved after two weeks. Exam is non-focal and unremarkable. No concerning neurological etiology suspected with a non-focal exam and negative MRI brain and C-spine. She is no longer taking Vit D. Suggest she have this rechecked at PCP's office. F/u as needed in neurology. ICD-10-CM ICD-9-CM    1. Paresthesia  R20.2 782.0    2. Numbness of face  R20.0 782.0        Signed:   Maikel Burrows MD  3/8/2022

## 2022-03-08 NOTE — LETTER
3/27/2022    Patient: Sarah Carlson   YOB: 1970   Date of Visit: 3/8/2022     Candelario Lugo MD  [facility] [place] [address]  Via Fax: [phone]    Dear Candelario Lugo MD,      Thank you for referring Ms. Vani Yeager to [address] [place] [address] for evaluation. My notes for this consultation are attached. If you have questions, please do not hesitate to call me. I look forward to following your patient along with you.       Sincerely,    Alicja Walker MD

## 2022-04-22 ENCOUNTER — APPOINTMENT (OUTPATIENT)
Dept: CT IMAGING | Age: 52
End: 2022-04-22
Attending: EMERGENCY MEDICINE
Payer: COMMERCIAL

## 2022-04-22 ENCOUNTER — HOSPITAL ENCOUNTER (EMERGENCY)
Age: 52
Discharge: HOME | End: 2022-04-22
Attending: EMERGENCY MEDICINE | Admitting: EMERGENCY MEDICINE
Payer: COMMERCIAL

## 2022-04-22 VITALS
DIASTOLIC BLOOD PRESSURE: 82 MMHG | BODY MASS INDEX: 28.09 KG/M2 | RESPIRATION RATE: 18 BRPM | HEART RATE: 72 BPM | OXYGEN SATURATION: 100 % | TEMPERATURE: 97.5 F | WEIGHT: 166.23 LBS | SYSTOLIC BLOOD PRESSURE: 148 MMHG

## 2022-04-22 DIAGNOSIS — H53.8 BLURRY VISION, LEFT EYE: Primary | ICD-10-CM

## 2022-04-22 LAB
ALBUMIN SERPL-MCNC: 3.9 G/DL (ref 3.5–5)
ALBUMIN/GLOB SERPL: 0.9 {RATIO} (ref 1.1–2.2)
ALP SERPL-CCNC: 181 U/L (ref 45–117)
ALT SERPL-CCNC: 55 U/L (ref 12–78)
ANION GAP SERPL CALC-SCNC: 5 MMOL/L (ref 5–15)
AST SERPL-CCNC: 34 U/L (ref 15–37)
BASOPHILS # BLD: 0 K/UL (ref 0–0.1)
BASOPHILS NFR BLD: 1 % (ref 0–1)
BILIRUB SERPL-MCNC: 0.4 MG/DL (ref 0.2–1)
BUN SERPL-MCNC: 10 MG/DL (ref 6–20)
BUN/CREAT SERPL: 10 (ref 12–20)
CALCIUM SERPL-MCNC: 9.4 MG/DL (ref 8.5–10.1)
CHLORIDE SERPL-SCNC: 105 MMOL/L (ref 97–108)
CO2 SERPL-SCNC: 29 MMOL/L (ref 21–32)
COMMENT, HOLDF: NORMAL
CREAT SERPL-MCNC: 0.97 MG/DL (ref 0.55–1.02)
CRP SERPL HS-MCNC: 4 MG/L
DIFFERENTIAL METHOD BLD: NORMAL
EOSINOPHIL # BLD: 0.1 K/UL (ref 0–0.4)
EOSINOPHIL NFR BLD: 1 % (ref 0–7)
ERYTHROCYTE [DISTWIDTH] IN BLOOD BY AUTOMATED COUNT: 14.5 % (ref 11.5–14.5)
ERYTHROCYTE [SEDIMENTATION RATE] IN BLOOD: 22 MM/HR (ref 0–30)
GLOBULIN SER CALC-MCNC: 4.5 G/DL (ref 2–4)
GLUCOSE BLD STRIP.AUTO-MCNC: 82 MG/DL (ref 65–117)
GLUCOSE SERPL-MCNC: 84 MG/DL (ref 65–100)
HCT VFR BLD AUTO: 39.7 % (ref 35–47)
HGB BLD-MCNC: 12.5 G/DL (ref 11.5–16)
IMM GRANULOCYTES # BLD AUTO: 0 K/UL (ref 0–0.04)
IMM GRANULOCYTES NFR BLD AUTO: 0 % (ref 0–0.5)
INR PPP: 0.9 (ref 0.9–1.1)
LYMPHOCYTES # BLD: 1.7 K/UL (ref 0.8–3.5)
LYMPHOCYTES NFR BLD: 36 % (ref 12–49)
MCH RBC QN AUTO: 26.2 PG (ref 26–34)
MCHC RBC AUTO-ENTMCNC: 31.5 G/DL (ref 30–36.5)
MCV RBC AUTO: 83.2 FL (ref 80–99)
MONOCYTES # BLD: 0.6 K/UL (ref 0–1)
MONOCYTES NFR BLD: 13 % (ref 5–13)
NEUTS SEG # BLD: 2.4 K/UL (ref 1.8–8)
NEUTS SEG NFR BLD: 49 % (ref 32–75)
NRBC # BLD: 0 K/UL (ref 0–0.01)
NRBC BLD-RTO: 0 PER 100 WBC
PLATELET # BLD AUTO: 264 K/UL (ref 150–400)
PMV BLD AUTO: 8.9 FL (ref 8.9–12.9)
POTASSIUM SERPL-SCNC: 3.7 MMOL/L (ref 3.5–5.1)
PROT SERPL-MCNC: 8.4 G/DL (ref 6.4–8.2)
PROTHROMBIN TIME: 9.6 SEC (ref 9–11.1)
RBC # BLD AUTO: 4.77 M/UL (ref 3.8–5.2)
SAMPLES BEING HELD,HOLD: NORMAL
SERVICE CMNT-IMP: NORMAL
SODIUM SERPL-SCNC: 139 MMOL/L (ref 136–145)
WBC # BLD AUTO: 4.9 K/UL (ref 3.6–11)

## 2022-04-22 PROCEDURE — 80053 COMPREHEN METABOLIC PANEL: CPT

## 2022-04-22 PROCEDURE — 0042T CT CODE NEURO PERF W CBF: CPT

## 2022-04-22 PROCEDURE — 70496 CT ANGIOGRAPHY HEAD: CPT

## 2022-04-22 PROCEDURE — 85610 PROTHROMBIN TIME: CPT

## 2022-04-22 PROCEDURE — 86141 C-REACTIVE PROTEIN HS: CPT

## 2022-04-22 PROCEDURE — 70450 CT HEAD/BRAIN W/O DYE: CPT

## 2022-04-22 PROCEDURE — 85025 COMPLETE CBC W/AUTO DIFF WBC: CPT

## 2022-04-22 PROCEDURE — 99284 EMERGENCY DEPT VISIT MOD MDM: CPT

## 2022-04-22 PROCEDURE — 82962 GLUCOSE BLOOD TEST: CPT

## 2022-04-22 PROCEDURE — 74011000636 HC RX REV CODE- 636: Performed by: RADIOLOGY

## 2022-04-22 PROCEDURE — 85652 RBC SED RATE AUTOMATED: CPT

## 2022-04-22 PROCEDURE — 93005 ELECTROCARDIOGRAM TRACING: CPT

## 2022-04-22 RX ADMIN — IOPAMIDOL 80 ML: 755 INJECTION, SOLUTION INTRAVENOUS at 13:56

## 2022-04-22 RX ADMIN — IOPAMIDOL 40 ML: 755 INJECTION, SOLUTION INTRAVENOUS at 13:51

## 2022-04-22 NOTE — ED TRIAGE NOTES
Pt reports left eye aching and then it began around 10am. Pt reports she began to experience left eye blurred vision. Pt went to her eye doctor prior to arrival. Pt states right eye has normal vision when left eye is covered. 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 No extremity weakness, no changes no changes in speech, no facial droop. Dr. Deirdre Glaser with pt in triage. Level one stroke called.

## 2022-04-22 NOTE — PROGRESS NOTES
Neurocritical Care Code Stroke Documentation      Symptoms:   Left eye ptosis, left cheek numbness, blurry vision, anisocoria. Sent by Dr. Casas Courser ([phone]) for CN III palsy   Last Known Well: 10:00 am   Medical hx: Past Medical History:   Diagnosis Date    Hypothyroid     Vitamin D deficiency       Anticoagulation: Aspirin    VAN:  Negative   NIHSS:   1a-LOC:0    1b-Month/Age:0    1c-Open/Close Hand:0    2-Best Gaze:0    3-Visual Fields:0    4-Facial Palsy:0    5a-Left Arm:0    5b-Right Arm:0    6a-Left Le    6b-Right Le    7-Limb Ataxia:0    8-Sensory:1    9-Best Language:0    10-Dysarthria:0    11-Extinction/Inattention:0  TOTAL SCORE:1   Imaging:   CT- no acute intracranial process    CTA- There is no major vessel occlusion. There is no hemodynamically significant stenosis, aneurysm or dissection identified. CTP- no perfusion defect   Plan:   TPA Candidate: NO    Mechanical thrombectomy Candidate: NO     *Perform dysphagia screening prior to any PO intake*    Discussed with: Dr. Mary Briggs time: 1:22  Time spent:  minutes.      Cosme Hernandez NP  Neurocritical Care Nurse Practitioner  [phone]

## 2022-04-22 NOTE — ED PROVIDER NOTES
50-year-old female history of hypothyroid presents to the emergency department chief complaint of left eye blurry vision. This began suddenly at 1030 today. She went to ophthalmology who felt that she had a cranial nerve III palsy and sent her to the emergency department for further evaluation. Patient denies any fevers or chills or injuries. No history of stroke    The history is provided by the patient and medical records. Eye Problem   This is a new problem. The current episode started 3 to 5 hours ago. The problem occurs constantly. The problem has not changed since onset. The left eye is affected. The injury mechanism was none. The patient is experiencing no pain. There is no history of trauma to the eye. Associated symptoms include blurred vision. Pertinent negatives include no nausea, no vomiting, no weakness and no fever. Past Medical History:   Diagnosis Date    Hypothyroid     Vitamin D deficiency        No past surgical history on file.       Family History:   Problem Relation Age of Onset    High Cholesterol Mother     Hypertension Father     No Known Problems Sister     No Known Problems Daughter     No Known Problems Daughter        Social History     Socioeconomic History    Marital status:      Spouse name: Not on file    Number of children: Not on file    Years of education: Not on file    Highest education level: Not on file   Occupational History    Occupation: Statistics at the [employer] Use    Smoking status: Never Smoker    Smokeless tobacco: Never Used   Substance and Sexual Activity    Alcohol use: Never    Drug use: Never    Sexual activity: Not on file   Other Topics Concern    Not on file   Social History Narrative    , lives in [place] with      Social Determinants of Health     Financial Resource Strain:     Difficulty of Paying Living Expenses: Not on file   Food Insecurity:     Worried About Running Out of Food in the Last Year: Not on file    Ran Out of Food in the Last Year: Not on file   Transportation Needs:     Lack of Transportation (Medical): Not on file    Lack of Transportation (Non-Medical): Not on file   Physical Activity:     Days of Exercise per Week: Not on file    Minutes of Exercise per Session: Not on file   Stress:     Feeling of Stress : Not on file   Social Connections:     Frequency of Communication with Friends and Family: Not on file    Frequency of Social Gatherings with Friends and Family: Not on file    Attends Mosque Services: Not on file    Active Member of [address] [place] [address] or Organizations: Not on file    Attends Club or Organization Meetings: Not on file    Marital Status: Not on file   Intimate Partner Violence:     Fear of Current or Ex-Partner: Not on file    Emotionally Abused: Not on file    Physically Abused: Not on file    Sexually Abused: Not on file   Housing Stability:     Unable to Pay for Housing in the Last Year: Not on file    Number of [place] in the Last Year: Not on file    Unstable Housing in the Last Year: Not on file         ALLERGIES: Patient has no known allergies. Review of Systems   Constitutional: Negative for fatigue and fever. HENT: Negative for sneezing and sore throat. Eyes: Positive for blurred vision and visual disturbance. Respiratory: Negative for cough and shortness of breath. Cardiovascular: Negative for chest pain and leg swelling. Gastrointestinal: Negative for abdominal pain, diarrhea, nausea and vomiting. Genitourinary: Negative for difficulty urinating and dysuria. Musculoskeletal: Negative for arthralgias and myalgias. Skin: Negative for color change and rash. Neurological: Negative for weakness and headaches. Psychiatric/Behavioral: Negative for agitation and behavioral problems.        Vitals:    04/22/22 1322   BP: (!) 160/90   Pulse: 73   Temp: 97.6 °F (36.4 °C)            Physical Exam  Vitals and nursing note reviewed. Constitutional:       General: She is not in acute distress. Appearance: Normal appearance. She is well-developed. She is not ill-appearing, toxic-appearing or diaphoretic. HENT:      Head: Normocephalic and atraumatic. Nose: Nose normal.      Mouth/Throat:      Mouth: Mucous membranes are moist.      Pharynx: Oropharynx is clear. Eyes:      Extraocular Movements: Extraocular movements intact. Conjunctiva/sclera: Conjunctivae normal.      Pupils: Pupils are equal, round, and reactive to light. Cardiovascular:      Rate and Rhythm: Normal rate and regular rhythm. Pulses: Normal pulses. Heart sounds: Normal heart sounds. Pulmonary:      Effort: Pulmonary effort is normal. No respiratory distress. Breath sounds: Normal breath sounds. No wheezing. Chest:      Chest wall: No tenderness. Abdominal:      General: Abdomen is flat. There is no distension. Palpations: Abdomen is soft. Tenderness: There is no abdominal tenderness. There is no guarding or rebound. Musculoskeletal:         General: No swelling, tenderness, deformity or signs of injury. Normal range of motion. Cervical back: Normal range of motion and neck supple. No rigidity. No muscular tenderness. Right lower leg: No edema. Left lower leg: No edema. Skin:     General: Skin is warm and dry. Capillary Refill: Capillary refill takes less than 2 seconds. Neurological:      General: No focal deficit present. Mental Status: She is alert and oriented to person, place, and time. Cranial Nerves: No cranial nerve deficit. Psychiatric:         Mood and Affect: Mood normal.         Behavior: Behavior normal.          MDM  Number of Diagnoses or Management Options  Diagnosis management comments: 18-year-old female presents as above with concern for sudden onset left-sided blurry vision. No aneurysm on CT scan or other intracranial abnormalities visualized.   After discussion with teleneurology there is concern for potential thyroid related proptosis causing her symptoms. No evidence of GCA, CVA, aneurysm, infection. Plan to discharge instructions to follow-up with her ophthalmology as well as with her endocrinologist.       Amount and/or Complexity of Data Reviewed  Clinical lab tests: reviewed  Tests in the radiology section of CPT®: reviewed      ED Course as of 04/22/22 1518   Fri Apr 22, 2022   1458 Patient seen by teleneurology. I discussed the case with them. They feel that the patient does not have a stroke syndrome which would require tPA or further evaluation in the ER or the hospital.  Potentially related to thyroid autoimmune disease. Recommends obtaining labs to ensure no GCA. Follow-up with ophthalmology and endocrinology.  [JM]      ED Course User Index  [JM] Latoya Morales MD       Procedures

## 2022-04-22 NOTE — DISCHARGE INSTRUCTIONS
Thank you for allowing us to provide you with medical care today. We realize that you have many choices for your emergency care needs. We thank you for choosing [facility]. Please choose us in the future for any continued health care needs. We hope we addressed all of your medical concerns. We strive to provide excellent quality care in the Emergency Department. Anything less than excellent does not meet our expectations. The exam and treatment you received in the Emergency Department were for an emergent problem and are not intended as complete care. It is important that you follow up with a doctor, nurse practitioner, or physician's assistant for ongoing care. If your symptoms worsen or you do not improve as expected and you are unable to reach your usual health care provider, you should return to the Emergency Department. We are available 24 hours a day. Take this sheet with you when you go to your follow-up visit. If you have any problem arranging the follow-up visit, contact the Emergency Department immediately. Make an appointment your family doctor for follow up of this visit. Return to the ER if you are unable to be seen in a timely manner.

## 2022-04-23 LAB
ATRIAL RATE: 69 BPM
CALCULATED P AXIS, ECG09: 47 DEGREES
CALCULATED R AXIS, ECG10: -7 DEGREES
CALCULATED T AXIS, ECG11: 14 DEGREES
DIAGNOSIS, 93000: NORMAL
P-R INTERVAL, ECG05: 156 MS
Q-T INTERVAL, ECG07: 372 MS
QRS DURATION, ECG06: 68 MS
QTC CALCULATION (BEZET), ECG08: 398 MS
VENTRICULAR RATE, ECG03: 69 BPM

## 2022-06-29 ENCOUNTER — TRANSCRIBE ORDER (OUTPATIENT)
Dept: SCHEDULING | Age: 52
End: 2022-06-29

## 2022-06-29 DIAGNOSIS — H05.241 CONSTANT EXOPHTHALMOS, RIGHT EYE: Primary | ICD-10-CM

## 2022-07-01 ENCOUNTER — HOSPITAL ENCOUNTER (OUTPATIENT)
Dept: CT IMAGING | Age: 52
Discharge: HOME | End: 2022-07-01
Attending: OPHTHALMOLOGY
Payer: COMMERCIAL

## 2022-07-01 DIAGNOSIS — H05.241 CONSTANT EXOPHTHALMOS, RIGHT EYE: ICD-10-CM

## 2022-07-01 PROCEDURE — 74011000636 HC RX REV CODE- 636: Performed by: OPHTHALMOLOGY

## 2022-07-01 PROCEDURE — 70482 CT ORBIT/EAR/FOSSA W/O&W/DYE: CPT

## 2022-07-01 RX ADMIN — IOPAMIDOL 100 ML: 612 INJECTION, SOLUTION INTRAVENOUS at 08:08

## 2022-09-01 ENCOUNTER — OFFICE VISIT (OUTPATIENT)
Dept: ENDOCRINOLOGY | Age: 52
End: 2022-09-01
Payer: COMMERCIAL

## 2022-09-01 VITALS
SYSTOLIC BLOOD PRESSURE: 145 MMHG | BODY MASS INDEX: 31.24 KG/M2 | DIASTOLIC BLOOD PRESSURE: 88 MMHG | HEIGHT: 64 IN | HEART RATE: 72 BPM | WEIGHT: 183 LBS

## 2022-09-01 DIAGNOSIS — E03.9 ACQUIRED HYPOTHYROIDISM: Primary | ICD-10-CM

## 2022-09-01 DIAGNOSIS — R79.9 ABNORMAL BLOOD CHEMISTRY TEST: ICD-10-CM

## 2022-09-01 PROCEDURE — 99204 OFFICE O/P NEW MOD 45 MIN: CPT | Performed by: INTERNAL MEDICINE

## 2022-09-01 NOTE — LETTER
9/1/2022    Patient: Carly Ramesh   YOB: 1970   Date of Visit: 9/1/2022     Lupillo Gardiner MD  [place] [address]  Via Fax: [phone]    Dear Lupillo Gardiner MD,      Thank you for referring Ms. Jason Ortiz to [facility] for evaluation. My notes for this consultation are attached. If you have questions, please do not hesitate to call me. I look forward to following your patient along with you.       Sincerely,    Alix Segura MD

## 2022-09-01 NOTE — PROGRESS NOTES
Chief Complaint   Patient presents with    Thyroid Problem       * New Patient Visit     General:  Lots of fluctuation in TSH levels and dosing  Saw thyroid eye disease specialist and ruled out thyroid as cause of issues  Lots of pins/needles in fingers, left arm hurts a lot     Dx hypothyroidism in 90s  Currently on 125mcg -- for 5 months -dose was higher   On generic  Was on synthroid in past   Prefers brand   Takes in AM with water, eats 2 hours  No Fe/Ca/MTV  Is in menopause - no other estrogen changes  No change in diet like less processed food   Has had weight shifts 20lb  Current wt based is 132, lowest weight was 120mcg     Thyroid Symptoms  denies change in energy, denies change in weight, denies change in appetite, denies sleep issues, denies hair changes, no skin changes, **has night sweats**, denies hot/cold intolerance, denies tremor, denies palpitations, denies change in bowels, no change in menses, denies mood changes    Neck Symptoms  denies dysphagia, denies anterior neck pain, denies neck swelling, notes no nodules      Labs/Studies    3/23/12 TSH 0.165, FT4 0.8, FT3 2.0  9/7/12 TSH 1.0, FT4 0.9, FT3 2.4  11/11/13 TSH 41, FT4 0.2, FT3 0.8  5/2/14 TSH 0.13, FT4 1.57, FT3 2.9  9/19/14 TSH 0.05, FT4 1.41, FT 2.5  6/12/15 TSH 3.9, FT4 1.3, FT3 2.5  11/13/15 TSH 6.2  3/18/16 TSH 0.6  10/20/17 TSH 3.4  11/23/18 TSH 0.11  1/18/19 TSH 0.012, FT4 2.0, FT3 3.2  5/3/19 TSH 0.25, FT4 1.36, FT3 2.6  10/18/10 TSH 29.7  12/30/10 TSH 5.5  7/31/20 TSH 22.4  11/13/20 TSH 0.012  5/13/21 TSH 1.5  10/1/21 TSH 0.013  3/9/22 TSH 0.35  7/1/22 TSH 0.094, FT4 1.62      Lab Results   Component Value Date/Time    TSH 0.277 (L) 05/03/2019 03:49 PM        Lab Results   Component Value Date/Time    TSH 0.277 (L) 05/03/2019 03:49 PM    TSH 0.39 12/23/2009 11:08 AM          Past Medical History:   Diagnosis Date    Hypothyroid     Vitamin D deficiency       Blood pressure (!) 145/88, pulse 72, height 5' 4\" (1.626 m), weight 183 lb (83 kg). Weight Metrics 9/1/2022 4/22/2022 3/8/2022 10/30/2021 9/28/2021 9/28/2021 5/3/2019   Weight 183 lb 166 lb 3.6 oz 186 lb 9.6 oz 179 lb 7.3 oz 181 lb 3.5 oz 181 lb 3.5 oz 175 lb   BMI 31.41 kg/m2 28.09 kg/m2 31.54 kg/m2 30.33 kg/m2 33.15 kg/m2 33.15 kg/m2 -        EXAM:  - GENERAL: NCAT, Appears well nourished   - EYES: EOMI, non-icteric, no proptosis   - Ear/Nose/Throat: NCAT, no visible inflammation or masses   - CARDIOVASCULAR: no cyanosis, no visible JVD   - RESPIRATORY: respiratory effort normal without any distress or labored breathing   - MUSCULOSKELETAL: Normal ROM of neck and upper extremities observed   - SKIN: No rash on face  - NEUROLOGIC:  No facial asymmetry (Cranial nerve 7 motor function), No gaze palsy   - PSYCHIATRIC: Normal affect, Normal insight and judgement      Assessment/Plan:   1. Acquired hypothyroidism  Quite variable TSH levels in the past.  Do not have history of dose changes  May be one that small dose changes causes a larger TSH change so have to be careful with changes  Will check absorption labs  Check current TSH status and adjust as needed  Plan to switch to brand for more consistency  Consider Tirosint or liquid thyroid replacement option  Currently going through menopause which can cause some changes, but a detailed history of labs show this is been a ongoing issue      Orders Placed This Encounter    GLUTEN SENSITIVITY SCREEN    CELIAC ANTIBODY PROFILE    H. PYLORI ABS, IGG, IGA, IGM    TSH 3RD GENERATION    TSH 3RD GENERATION     Standing Status:   Future     Standing Expiration Date:   3/1/2023          Follow-up and Dispositions    Return in about 2 months (around 11/1/2022).

## 2022-09-05 DIAGNOSIS — R79.9 ABNORMAL BLOOD CHEMISTRY TEST: ICD-10-CM

## 2022-09-05 DIAGNOSIS — E03.9 ACQUIRED HYPOTHYROIDISM: ICD-10-CM

## 2022-09-05 RX ORDER — LEVOTHYROXINE SODIUM 100 UG/1
TABLET ORAL
Qty: 30 TABLET | Refills: 1 | Status: SHIPPED | OUTPATIENT
Start: 2022-09-05 | End: 2022-11-01

## 2022-09-09 LAB
GLIADIN IGG SER IA-ACNC: 29 UNITS (ref 0–19)
GLIADIN PEPTIDE IGA SER-ACNC: 10 UNITS (ref 0–19)
GLIADIN PEPTIDE IGG SER-ACNC: 3 UNITS (ref 0–19)
GLIADIN PEPTIDE+TTG IGA+IGG SER QL IA: NEGATIVE
H PYLORI IGA SER-ACNC: >35 UNITS (ref 0–8.9)
H PYLORI IGG SER IA-ACNC: 6.45 INDEX VALUE (ref 0–0.79)
H PYLORI IGM SER-ACNC: <9 UNITS (ref 0–8.9)
IGA SERPL-MCNC: 250 MG/DL (ref 87–352)
Lab: NORMAL
TSH SERPL DL<=0.005 MIU/L-ACNC: 0.08 UIU/ML (ref 0.45–4.5)
TTG IGA SER-ACNC: <2 U/ML (ref 0–3)
TTG IGG SER-ACNC: 6 U/ML (ref 0–5)

## 2022-10-13 ENCOUNTER — TELEPHONE (OUTPATIENT)
Dept: ENDOCRINOLOGY | Age: 52
End: 2022-10-13

## 2022-10-13 DIAGNOSIS — E03.9 ACQUIRED HYPOTHYROIDISM: ICD-10-CM

## 2022-10-13 NOTE — TELEPHONE ENCOUNTER
Pls relay msg put on [address] that pt did not read    \"One of the celiac labs and gluten sensitivity labs were abnormal - both 'weakly' positive  The gold standard for diagnosing celiac is a small bowel biopsy (done while on a gluten diet)  So you may want to consider a gluten free diet\"

## 2022-10-13 NOTE — TELEPHONE ENCOUNTER
I called Ms. Gregor Urena and relayed the message from Dr. Diony Flores.  She seemed to understand this information and said she would do as instructed  Nam Greenfield

## 2022-10-31 ENCOUNTER — OFFICE VISIT (OUTPATIENT)
Dept: ORTHOPEDIC SURGERY | Age: 52
End: 2022-10-31
Payer: COMMERCIAL

## 2022-10-31 VITALS — BODY MASS INDEX: 28.93 KG/M2 | WEIGHT: 180 LBS | HEIGHT: 66 IN

## 2022-10-31 DIAGNOSIS — M54.50 ACUTE BILATERAL LOW BACK PAIN WITHOUT SCIATICA: Primary | ICD-10-CM

## 2022-10-31 DIAGNOSIS — M51.36 DEGENERATIVE DISC DISEASE, LUMBAR: ICD-10-CM

## 2022-10-31 PROCEDURE — 99203 OFFICE O/P NEW LOW 30 MIN: CPT | Performed by: PHYSICIAN ASSISTANT

## 2022-10-31 RX ORDER — CYCLOBENZAPRINE HCL 10 MG
TABLET ORAL
COMMUNITY
Start: 2022-10-25

## 2022-10-31 RX ORDER — DICLOFENAC SODIUM 75 MG/1
TABLET, DELAYED RELEASE ORAL
COMMUNITY
Start: 2022-10-25

## 2022-10-31 NOTE — PROGRESS NOTES
1. Have you been to the ER, urgent care clinic since your last visit? Hospitalized since your last visit? No    2. Have you seen or consulted any other health care providers outside of the [address] [place] [address] since your last visit? Include any pap smears or colon screening.  No    Chief Complaint   Patient presents with    Back Pain

## 2022-10-31 NOTE — PROGRESS NOTES
Bria Lloyd (: 1970) is a 47 y.o. female patient here for evaluation of the following chief complaint(s):  Back Pain         ASSESSMENT/PLAN:  Below is the assessment and plan developed based on review of pertinent history, physical exam, labs, studies, and medications. 1. Acute bilateral low back pain without sciatica  -     XR SPINE LUMB MIN 4 V; Future  -     REFERRAL TO PHYSICAL THERAPY  2. Degenerative disc disease, lumbar  -     REFERRAL TO PHYSICAL THERAPY      58-year-old female with mild degenerative disc disease. Her x-rays were reviewed with her in detail today and her questions were answered to her satisfaction. It seems as though her symptoms are starting to improve with rest and on her current medication regimen. We will treat her conservatively for now. I have given her prescription for formal physical therapy to work on stretching and strengthening. She can continue taking the diclofenac for her residual symptoms but we discussed weaning off of the Flexeril. She will follow-up with us in 6 to 8 weeks for reevaluation. Return in about 6 weeks (around 2022) for following PT.      SUBJECTIVE/OBJECTIVE:  Bria Lloyd (: 1970) is a 47 y.o. female who presents today for the following:  Chief Complaint   Patient presents with    Back Pain        HPI   58-year-old female presents today complaining of left-sided lower back pain that began approximately 1 week ago. She states she was talking in a sheet on her bed when she felt the back seized up on her. The pain was so intense that she did end up going to an urgent care where they took x-rays and sent her home with Flexeril and diclofenac for her symptoms. She states in addition to the left-sided back pain she also started experiencing right-sided foot numbness. She denies any radiation of pain or weakness in her buttocks or legs. She denies any changes in bowel or bladder control.   She states her symptoms have greatly improved over the last week. The numbness in her right foot has resolved and the pain in her back is 75% improved. She is currently still taking the diclofenac twice a day but only taking the Flexeril once a day. IMAGING:  XR Results (most recent):  Results from Appointment encounter on 10/31/22    XR SPINE LUMB MIN 4 V    Narrative  AP, lateral, flexion, and extension films of the lumbar spine reveal no evidence of acute fracture or lytic lesion. There is well-maintained lumbar lordosis. There is presence of mild disc degeneration at L4-L5. There is no evidence of spondylolisthesis. MRI Results (most recent):  Results from [place] encounter on 09/28/21    MRI CERV SPINE W WO CONT    Narrative  EXAM:  MRI CERV SPINE W WO CONT    INDICATION:   r/o ms    COMPARISON: CTA head and neck 9/28/2021. TECHNIQUE: Multiplanar multisequence acquisition without and with contrast of  the cervical spine. CONTRAST: 15 cc ProHance. FINDINGS:  The cervical cord is normal in size and signal. No evidence of abnormal  enhancement. There is normal alignment of the cervical spine. Vertebral body heights are  maintained without evidence of acute fracture. There is degenerative endplate  marrow edema at C4-C5 and C6-C7 (Modic type I). Marrow signal is otherwise  normal. Multilevel degenerative changes as detailed below. Region of the foramen  magnum is unremarkable. Visualized soft tissues are unremarkable. C2-C3: No significant disc herniation, spinal canal or neural foraminal  stenosis. C3-C4: Mild diffuse disc osteophyte complex. No significant spinal canal or  neural foraminal stenosis. C4-C5: Diffuse disc osteophyte complex with right-sided uncovertebral spurring. No significant spinal canal stenosis. Moderate right and no left neural  foraminal stenosis. C5-C6: Mild diffuse disc osteophyte complex with left-sided uncovertebral  spurring. No significant spinal canal stenosis. Mild left and no right neural  foraminal stenosis. C6-C7: Diffuse disc osteophyte complex with bilateral uncovertebral spurring. No  significant spinal canal stenosis. Moderate bilateral neural foraminal stenosis. C7-T1: No significant disc herniation, spinal canal or neural foraminal  stenosis. Impression  1. Normal cervical cord. No evidence of abnormal enhancement. 2. Degenerative changes as detailed above, including moderate bilateral neural  foraminal stenosis at C6-C7, and moderate right neural foraminal stenosis at  C4-C5. No significant spinal canal stenosis at any level. No Known Allergies    Current Outpatient Medications   Medication Sig    cyclobenzaprine (FLEXERIL) 10 mg tablet TAKE 1 TABLET BY MOUTH THREE TIMES A DAY FOR 10 DAYS    diclofenac EC (VOLTAREN) 75 mg EC tablet TAKE 1 TABLET BY MOUTH TWICE A DAY AS NEEDED    Unithroid 100 mcg tablet Take one tablet daily on an empty stomach    cyanocobalamin 1,000 mcg tablet Take 1,000 mcg by mouth daily. (Patient not taking: Reported on 10/31/2022)     No current facility-administered medications for this visit. Past Medical History:   Diagnosis Date    Hypothyroid     Vitamin D deficiency         History reviewed. No pertinent surgical history. Family History   Problem Relation Age of Onset    High Cholesterol Mother     Hypertension Father     No Known Problems Sister     No Known Problems Daughter     No Known Problems Daughter         Social History     Tobacco Use    Smoking status: Never    Smokeless tobacco: Never   Substance Use Topics    Alcohol use: Never        Review of Systems     No flowsheet data found. Vitals:  Ht 5' 6\" (1.676 m)   Wt 180 lb (81.6 kg)   BMI 29.05 kg/m²    Body mass index is 29.05 kg/m². Physical Exam    No assistive devices today.      Cervical spine:  Examination of the cervical spine demonstrates no tenderness on palpation, no pain, no swelling or edema, with normal lumbar range of motion. Thoracic spine: Examination of the thoracic spine demonstrates no tenderness on palpation, no pain, no swelling or edema. Normal sensation and range of motion. Lumbar spine:     Examination of the lumbar spine demonstrates on inspection: No erythema or ecchymosis. No masses noted. No pelvic obliquity. On palpation: No tenderness on palpation. Range of motion: Limited secondary to pain     Motor examination:  Right iliopsoas 5/5,  left iliopsoas 5/5, right quad 5/5, left quad 5/5, right anterior tibialis 5/5, left anterior tibialis 5/5, right EHL 5/5, left EHL 5/5, right gastrocnemius 5/5 , left gastrocnemius 5/5     Sensory examination: Normal sensation through all dermatomes. Reflexes: Left knee 2/2, right knee 2/2, right ankle 2/2, left ankle 2/2     Functional testing: Straight leg test negative at 30 degrees bilaterally; bowstring sign negative bilaterally. Babinsksi and Clonus negative bilaterally. Dr. Shruti Marni was available for immediate consult during this encounter. An electronic signature was used to authenticate this note.   -- Jv Henriquez PA-C

## 2022-11-01 DIAGNOSIS — E03.9 ACQUIRED HYPOTHYROIDISM: ICD-10-CM

## 2022-11-01 RX ORDER — LEVOTHYROXINE SODIUM 100 UG/1
TABLET ORAL
Qty: 30 TABLET | Refills: 1 | Status: SHIPPED | OUTPATIENT
Start: 2022-11-01 | End: 2022-12-01

## 2022-11-02 ENCOUNTER — OFFICE VISIT (OUTPATIENT)
Dept: ENDOCRINOLOGY | Age: 52
End: 2022-11-02
Payer: COMMERCIAL

## 2022-11-02 VITALS
HEART RATE: 82 BPM | WEIGHT: 186 LBS | DIASTOLIC BLOOD PRESSURE: 82 MMHG | SYSTOLIC BLOOD PRESSURE: 127 MMHG | HEIGHT: 66 IN | BODY MASS INDEX: 29.89 KG/M2

## 2022-11-02 DIAGNOSIS — E03.9 ACQUIRED HYPOTHYROIDISM: Primary | ICD-10-CM

## 2022-11-02 DIAGNOSIS — R79.9 ABNORMAL BLOOD CHEMISTRY TEST: ICD-10-CM

## 2022-11-02 PROCEDURE — 99214 OFFICE O/P EST MOD 30 MIN: CPT | Performed by: INTERNAL MEDICINE

## 2022-11-02 NOTE — LETTER
11/2/2022    Patient: Víctor Harley   YOB: 1970   Date of Visit: 11/2/2022     Tonia Spencer MD  [facility] [address]  Via Fax: [phone]     Shae Mendoza PA-C  [facility] [address] [id number]  Via In [facility] [address]    Dear MD Shae Hope PA-C,      Thank you [facility]. Paul Lott [facility]. My notes for this consultation are attached. If you have questions, please do not hesitate to call me. I look forward to following your patient along with you.       Sincerely,    Lizette Castro MD

## 2022-11-02 NOTE — PROGRESS NOTES
Chief Complaint   Patient presents with    Thyroid Problem       * Since Last Visit: -9/1/2022     Lowered to 100mcg and switched to brand   Unable to do gluten free  Did not do stool test for H pylori    General:  From initial visit 9/1/2022   Lots of fluctuation in TSH levels and dosing  Saw thyroid eye disease specialist and ruled out thyroid as cause of issues  Lots of pins/needles in fingers, left arm hurts a lot     Dx hypothyroidism in 90s  Currently on 125mcg -- for 5 months -dose was higher   On generic  Was on synthroid in past   Prefers brand   Takes in AM with water, eats 2 hours  No Fe/Ca/MTV  Is in menopause - no other estrogen changes  No change in diet like less processed food   Has had weight shifts 20lb  Current wt based is 132, lowest weight was 120mcg     Thyroid Symptoms  denies change in energy, denies change in weight, denies change in appetite, denies sleep issues, denies hair changes, no skin changes, **has night sweats**, denies hot/cold intolerance, denies tremor, denies palpitations, denies change in bowels, no change in menses, denies mood changes    Neck Symptoms  denies dysphagia, denies anterior neck pain, denies neck swelling, notes no nodules      Labs/Studies    3/23/12 TSH 0.165, FT4 0.8, FT3 2.0  9/7/12 TSH 1.0, FT4 0.9, FT3 2.4  11/11/13 TSH 41, FT4 0.2, FT3 0.8  5/2/14 TSH 0.13, FT4 1.57, FT3 2.9  9/19/14 TSH 0.05, FT4 1.41, FT 2.5  6/12/15 TSH 3.9, FT4 1.3, FT3 2.5  11/13/15 TSH 6.2  3/18/16 TSH 0.6  10/20/17 TSH 3.4  11/23/18 TSH 0.11  1/18/19 TSH 0.012, FT4 2.0, FT3 3.2  5/3/19 TSH 0.25, FT4 1.36, FT3 2.6  10/18/10 TSH 29.7  12/30/10 TSH 5.5  7/31/20 TSH 22.4  11/13/20 TSH 0.012  5/13/21 TSH 1.5  10/1/21 TSH 0.013  3/9/22 TSH 0.35  7/1/22 TSH 0.094, FT4 1.62      Lab Results   Component Value Date/Time    TSH 0.082 (L) 09/01/2022 10:43 AM        Lab Results   Component Value Date/Time    TSH 0.082 (L) 09/01/2022 10:43 AM    TSH 0.277 (L) 05/03/2019 03:49 PM    TSH 0.39 12/23/2009 11:08 AM          Past Medical History:   Diagnosis Date    Hypothyroid     Vitamin D deficiency       Blood pressure 127/82, pulse 82, height 5' 6\" (1.676 m), weight 186 lb (84.4 kg). Weight Metrics 11/2/2022 10/31/2022 9/1/2022 4/22/2022 3/8/2022 10/30/2021 9/28/2021   Weight 186 lb 180 lb 183 lb 166 lb 3.6 oz 186 lb 9.6 oz 179 lb 7.3 oz 181 lb 3.5 oz   BMI 30.02 kg/m2 29.05 kg/m2 31.41 kg/m2 28.09 kg/m2 31.54 kg/m2 30.33 kg/m2 33.15 kg/m2        EXAM:  - not done today     Assessment/Plan:   1. Acquired hypothyroidism  Quite variable TSH levels in the past.  May be one that small dose changes causes a larger TSH change so have to be careful with changes  Screening H gagan was positive - did not do stool test -- will bring up with her PCP  Both celiac & gluten sensitivity panel were slightly positive -but feels can't go gluten free - could be cause of her variability - hard to say    Check current TSH status and adjust as needed -had lowered dose and switched to brand last visit  Can consider Tirosint or liquid thyroid replacement option  Currently going through menopause which can cause some changes, but a detailed history of labs show this is been a ongoing issue      Orders Placed This Encounter    TSH 3RD GENERATION     Standing Status:   Future     Standing Expiration Date:   5/2/2023            Follow-up and Dispositions    Return in about 3 months (around 2/2/2023).

## 2022-11-10 NOTE — PROGRESS NOTES
Kat Ansari (: 1970) is a 47 y.o. female patient here for evaluation of the following chief complaint(s):  Back Pain       Patient Name: Kat Ansari  Date:2022  : 1970  [x]  Patient  Verified  Payor: Janet Joyce / Plan: Treinta Y Nader [id number] PPO / Product Type: PPO /    In time: 10:05  Out time: 11:00  Total Treatment Time (min): 55  Total Timed Codes (min): 45  1:1 Treatment Time (MC only): NA   Visit #: 1  80      SUBJECTIVE/OBJECTIVE:  HPI    The patient is a 25-year-old female presenting with left-sided lower back pain that began approximately 1 week ago. She states she was talking in a sheet on her bed when she felt the back seized up on her. The pain was so intense that she did end up going to an urgent care where they took x-rays and sent her home with Flexeril and diclofenac for her symptoms. She has been taking the diclofenac twice a day but only taking the Flexeril once a day. Since the incident she has been having some difficulty with prolonged sitting, standing, and walking. She is also having pain when sleeping on her right side, she is able to sleep on her left side. She prefers to sleep on her right side. No report of numbness, tingling, lower extremity weakness. She has an exercise bike at home, she rides about 45 minutes as well as walks for exercise. She has not tried her exercise routine since the onset of low back pain. She works as a  for [employer] in [place]. Physical Exam    Physical exam: Spine.     Range of motion:  Trunk extension-able, stiff  lumbar flexion -able, can reach shin level, mild stiffness upon return to standing  Lateral sidebend-greater limitation with side bending to the left  Right/left rotation-within functional limits    MMT- on right  Hip flex/ext- 5/5  Hip IR/ER- 5/5  Hip abd- 5/5  Knee flex/ext- 5/5  Heel/toe walking- able    MMT- on left  Hip flex/ext- 5/5  Hip IR/ER- 5/5  Hip abd- 5/5  Knee flex/ext- 5/5  Heel/toe walking- able    Squatting: Able, pain-free of the left low back    SLS: The patient is able to balance 10 seconds bilaterally    Flexibility:   90-90 Hamstring flexibility-minimal to moderate deficit  Hip flexors (Esteban test)-minimal to moderate deficit  Quadriceps-minimal to moderate deficit    Neurologic exam  Dermatomes-no loss of sensation of all dermatomes to light touch    Functional mobility: patient is hypomobile of the lumbar segments L1-L5    Palpation: tender to palpate of the left lumbar spinal extensors greater than the right, L5 up into the thoracic spine    Gait: Patient ambulates with WFL gait pattern    Special tests:  Neural tension   Slump-negative  Crossed SLR-negative    Oswestry Low Back Pain Questionnaire: 7/50         Therapeutic exercise performed: 90-90 heel taps, hamstring stretch, hip flexor stretch, hip flexor release, standing forward lumbar flexion stretch. 20 minutes. Patient finished with heat to the left low back for 10 minutes. An electronic signature was used to authenticate this note. -- Edinson Landeros PT       ASSESSMENT/PLAN:  Below is the assessment and plan developed based on review of pertinent history, physical exam, labs, studies, and medications. 1. Acute bilateral low back pain without sciatica  2. Degenerative disc disease, lumbar      Return in about 1 week (around 11/18/2022). The patient is a 50-year-old female presenting with a history of left-sided low back pain, onset of symptoms about a week ago.     She currently has the following physical therapy impairments: slightly increased left-sided low back pain, loss of ROM into lumbar flexion and extension, side bending, and rotation; mild-moderate loss of quadriceps, hamstring, and hip flexor flexibility on the left > right; inability to squat,  walk, sit, or stand for prolonged periods of time; and decreased tolerance to daily activity and recreational activity including distance walking and riding her stationary bike. She would benefit from skilled physical therapy services in order to address the above impairments to allow for full functional return and return to recreational activities. Reviewed a home program with the patient, focus will be given to addressing range of motion deficits, flexibility restrictions, core muscle weakness, and working towards squatting and weight bearing exercises as appropriate. The patient appeared to understand the plan of care and was in agreement on the home program. She will attend physical therapy for 1-2 visits a week, for 1 month in order to address the above impairments. Goals to be met in 4-6 weeks:  1. Patient will improve  tolerance to lumbar flexion, extension, sidebending, and rotation in order to perform daily activities  2. Patient will  be able to ambulate for greater than 30 minutes without pain of the low back and without fatigue  3. Patient will be able to return to cycling 40 to 45 minutes without incidence of left sided low back pain  4. Patient will  improve flexibility of the hamstrings, quadriceps, and hip flexors to improve tolerance to daily activity  5. The patient will be independent with a HEP        An electronic signature was used to authenticate this note.   -- Nilay Xavier, PT

## 2022-11-11 ENCOUNTER — OFFICE VISIT (OUTPATIENT)
Dept: ORTHOPEDIC SURGERY | Age: 52
End: 2022-11-11
Payer: COMMERCIAL

## 2022-11-11 DIAGNOSIS — M54.50 ACUTE BILATERAL LOW BACK PAIN WITHOUT SCIATICA: Primary | ICD-10-CM

## 2022-11-11 DIAGNOSIS — M51.36 DEGENERATIVE DISC DISEASE, LUMBAR: ICD-10-CM

## 2022-11-11 PROCEDURE — 97110 THERAPEUTIC EXERCISES: CPT | Performed by: PHYSICAL THERAPIST

## 2022-11-11 PROCEDURE — 97161 PT EVAL LOW COMPLEX 20 MIN: CPT | Performed by: PHYSICAL THERAPIST

## 2022-12-01 DIAGNOSIS — E03.9 ACQUIRED HYPOTHYROIDISM: ICD-10-CM

## 2022-12-01 RX ORDER — LEVOTHYROXINE SODIUM 100 UG/1
TABLET ORAL
Qty: 30 TABLET | Refills: 1 | Status: SHIPPED | OUTPATIENT
Start: 2022-12-01

## 2022-12-22 DIAGNOSIS — E03.9 ACQUIRED HYPOTHYROIDISM: ICD-10-CM

## 2022-12-22 RX ORDER — LEVOTHYROXINE SODIUM 100 UG/1
TABLET ORAL
Qty: 90 TABLET | Refills: 0 | Status: SHIPPED | OUTPATIENT
Start: 2022-12-22

## 2023-01-02 DIAGNOSIS — E03.9 ACQUIRED HYPOTHYROIDISM: ICD-10-CM

## 2023-02-02 ENCOUNTER — OFFICE VISIT (OUTPATIENT)
Dept: ENDOCRINOLOGY | Age: 53
End: 2023-02-02
Payer: COMMERCIAL

## 2023-02-02 VITALS
HEIGHT: 66 IN | WEIGHT: 189 LBS | SYSTOLIC BLOOD PRESSURE: 119 MMHG | DIASTOLIC BLOOD PRESSURE: 77 MMHG | HEART RATE: 61 BPM | BODY MASS INDEX: 30.37 KG/M2

## 2023-02-02 DIAGNOSIS — R79.9 ABNORMAL BLOOD CHEMISTRY TEST: ICD-10-CM

## 2023-02-02 DIAGNOSIS — E03.9 ACQUIRED HYPOTHYROIDISM: Primary | ICD-10-CM

## 2023-02-02 PROCEDURE — 99214 OFFICE O/P EST MOD 30 MIN: CPT | Performed by: INTERNAL MEDICINE

## 2023-02-02 NOTE — PROGRESS NOTES
Chief Complaint   Patient presents with    Thyroid Problem       * Since Last Visit: -11/2/2022     On 100mcg   On brand, takes correctly, no missed doses    Unable to do gluten free  No breath test for H pylori yet    General:  From initial visit 9/1/2022   Lots of fluctuation in TSH levels and dosing  Saw thyroid eye disease specialist and ruled out thyroid as cause of issues  Lots of pins/needles in fingers, left arm hurts a lot     Dx hypothyroidism in 90s  Currently on 125mcg -- for 5 months -dose was higher   On generic  Was on synthroid in past   Prefers brand   Takes in AM with water, eats 2 hours  No Fe/Ca/MTV  Is in menopause - no other estrogen changes  No change in diet like less processed food   Has had weight shifts 20lb  Current wt based is 132, lowest weight was 120mcg     Thyroid Symptoms  denies change in energy, denies change in weight, denies change in appetite, denies sleep issues, denies hair changes, no skin changes, **has night sweats**,(hot flashes) denies hot/cold intolerance, denies tremor, denies palpitations, denies change in bowels, no change in menses, denies mood changes    Neck Symptoms  denies dysphagia, denies anterior neck pain, denies neck swelling, notes no nodules      Labs/Studies    3/23/12 TSH 0.165, FT4 0.8, FT3 2.0  9/7/12 TSH 1.0, FT4 0.9, FT3 2.4  11/11/13 TSH 41, FT4 0.2, FT3 0.8  5/2/14 TSH 0.13, FT4 1.57, FT3 2.9  9/19/14 TSH 0.05, FT4 1.41, FT 2.5  6/12/15 TSH 3.9, FT4 1.3, FT3 2.5  11/13/15 TSH 6.2  3/18/16 TSH 0.6  10/20/17 TSH 3.4  11/23/18 TSH 0.11  1/18/19 TSH 0.012, FT4 2.0, FT3 3.2  5/3/19 TSH 0.25, FT4 1.36, FT3 2.6  10/18/10 TSH 29.7  12/30/10 TSH 5.5  7/31/20 TSH 22.4  11/13/20 TSH 0.012  5/13/21 TSH 1.5  10/1/21 TSH 0.013  3/9/22 TSH 0.35  7/1/22 TSH 0.094, FT4 1.62      Lab Results   Component Value Date/Time    TSH 2.650 11/02/2022 10:37 AM        Lab Results   Component Value Date/Time    TSH 2.650 11/02/2022 10:37 AM    TSH 0.082 (L) 09/01/2022 10:43 AM    TSH 0.277 (L) 05/03/2019 03:49 PM    TSH 0.39 12/23/2009 11:08 AM          Past Medical History:   Diagnosis Date    Hypothyroid     Vitamin D deficiency       Blood pressure 119/77, pulse 61, height 5' 6\" (1.676 m), weight 189 lb (85.7 kg). Weight Metrics 2/2/2023 11/2/2022 10/31/2022 9/1/2022 4/22/2022 3/8/2022 10/30/2021   Weight 189 lb 186 lb 180 lb 183 lb 166 lb 3.6 oz 186 lb 9.6 oz 179 lb 7.3 oz   BMI 30.51 kg/m2 30.02 kg/m2 29.05 kg/m2 31.41 kg/m2 28.09 kg/m2 31.54 kg/m2 30.33 kg/m2        EXAM:  - not done today     Assessment/Plan:   1. Acquired hypothyroidism  Quite variable TSH levels in the past.  Both celiac & gluten sensitivity panel were slightly positive -but feels can't go gluten free - could be cause of her variability - hard to say    Last TSH good - had to lower dose once on brand and taking correctly   Check current status (for stability) and adjust prn  Can consider Tirosint or liquid thyroid replacement option  Currently going through menopause which can cause some changes, but a detailed history of labs show this is been a ongoing issue    2. Abnormal blood chemistry test  Get breath test for H pylori today      Orders Placed This Encounter    H. PYLORI BREATH TEST     Standing Status:   Future     Standing Expiration Date:   2/2/2024    TSH 3RD GENERATION    TSH 3RD GENERATION     Standing Status:   Future     Standing Expiration Date:   2/2/2024              Follow-up and Dispositions    Return in about 6 months (around 8/2/2023).

## 2023-02-02 NOTE — LETTER
2/2/2023    Patient: Robin Pantoja   YOB: 1970   Date of Visit: 2/2/2023     Carmelina Hector MD  [place] [address]  Via Fax: [phone]    Dear Carmelina Hector MD,      Thank you for referring Ms. Janine Phipps to [facility] for evaluation. My notes for this consultation are attached. If you have questions, please do not hesitate to call me. I look forward to following your patient along with you.       Sincerely,    Rosangela Hemphill MD

## 2023-02-03 LAB
H. PYLORI BREATH COLLECTION, 998167: NORMAL
TSH SERPL DL<=0.005 MIU/L-ACNC: 1.14 UIU/ML (ref 0.45–4.5)
UREA BREATH TEST QL: POSITIVE

## 2023-04-22 DIAGNOSIS — E03.9 ACQUIRED HYPOTHYROIDISM: Primary | ICD-10-CM

## 2023-05-02 DIAGNOSIS — E03.9 ACQUIRED HYPOTHYROIDISM: ICD-10-CM

## 2023-05-02 RX ORDER — LEVOTHYROXINE SODIUM 100 UG/1
TABLET ORAL
Qty: 90 TABLET | Refills: 0 | Status: SHIPPED | OUTPATIENT
Start: 2023-05-02

## 2023-07-02 DIAGNOSIS — E03.9 ACQUIRED HYPOTHYROIDISM: ICD-10-CM

## 2023-08-02 DIAGNOSIS — E03.9 HYPOTHYROIDISM, UNSPECIFIED: ICD-10-CM

## 2023-08-03 RX ORDER — LEVOTHYROXINE SODIUM 100 UG/1
TABLET ORAL
Qty: 90 TABLET | Refills: 0 | Status: SHIPPED | OUTPATIENT
Start: 2023-08-03

## 2023-11-01 NOTE — PROGRESS NOTES
Chief Complaint   Patient presents with    Thyroid Problem       * Since Last Visit: - 2/2/2023     On 100mcg   On brand, takes correctly, no missed doses    Unable to do gluten free  Breath test for H pylori was positive - did treatment; did not repeat the test     General:  From initial visit 9/1/2022   Lots of fluctuation in TSH levels and dosing  Saw thyroid eye disease specialist and ruled out thyroid as cause of issues  Lots of pins/needles in fingers, left arm hurts a lot     Dx hypothyroidism in 90s  Currently on 125mcg -- for 5 months -dose was higher   On generic  Was on synthroid in past   Prefers brand   Takes in AM with water, eats 2 hours  No Fe/Ca/MTV  Is in menopause - no other estrogen changes  No change in diet like less processed food   Has had weight shifts 20lb  Current wt based is 132, lowest weight was 120mcg     Thyroid Symptoms  denies change in energy, denies change in weight, denies change in appetite, denies sleep issues, denies hair changes, no skin changes, denies sweats, denies hot/cold intolerance, denies tremor, denies palpitations, denies change in bowels, no change in menses, denies mood changes    Neck Symptoms  denies dysphagia, denies anterior neck pain, denies neck swelling, notes no nodules    Labs/Studies    3/23/12 TSH 0.165, FT4 0.8, FT3 2.0  9/7/12 TSH 1.0, FT4 0.9, FT3 2.4  11/11/13 TSH 41, FT4 0.2, FT3 0.8  5/2/14 TSH 0.13, FT4 1.57, FT3 2.9  9/19/14 TSH 0.05, FT4 1.41, FT 2.5  6/12/15 TSH 3.9, FT4 1.3, FT3 2.5  11/13/15 TSH 6.2  3/18/16 TSH 0.6  10/20/17 TSH 3.4  11/23/18 TSH 0.11  1/18/19 TSH 0.012, FT4 2.0, FT3 3.2  5/3/19 TSH 0.25, FT4 1.36, FT3 2.6  10/18/10 TSH 29.7  12/30/10 TSH 5.5  7/31/20 TSH 22.4  11/13/20 TSH 0.012  5/13/21 TSH 1.5  10/1/21 TSH 0.013  3/9/22 TSH 0.35  7/1/22 TSH 0.094, FT4 1.62    Lab Results   Component Value Date/Time    TSH 1.140 02/02/2023 09:42 AM    TSH 2.650 11/02/2022 10:37 AM    TSH 0.082 09/01/2022 10:43 AM      Past Medical

## 2023-11-02 ENCOUNTER — OFFICE VISIT (OUTPATIENT)
Age: 53
End: 2023-11-02
Payer: COMMERCIAL

## 2023-11-02 ENCOUNTER — TELEPHONE (OUTPATIENT)
Age: 53
End: 2023-11-02

## 2023-11-02 VITALS
DIASTOLIC BLOOD PRESSURE: 88 MMHG | HEART RATE: 71 BPM | HEIGHT: 66 IN | BODY MASS INDEX: 31.02 KG/M2 | WEIGHT: 193 LBS | SYSTOLIC BLOOD PRESSURE: 147 MMHG

## 2023-11-02 DIAGNOSIS — A04.8 H. PYLORI INFECTION: ICD-10-CM

## 2023-11-02 DIAGNOSIS — E03.9 ACQUIRED HYPOTHYROIDISM: Primary | ICD-10-CM

## 2023-11-02 PROCEDURE — 99204 OFFICE O/P NEW MOD 45 MIN: CPT | Performed by: INTERNAL MEDICINE

## 2023-11-03 LAB — TSH SERPL DL<=0.005 MIU/L-ACNC: 2.18 UIU/ML (ref 0.45–4.5)

## 2023-11-09 DIAGNOSIS — E03.9 HYPOTHYROIDISM, UNSPECIFIED: ICD-10-CM

## 2023-11-09 RX ORDER — LEVOTHYROXINE SODIUM 100 UG/1
TABLET ORAL
Qty: 90 TABLET | Refills: 2 | Status: SHIPPED | OUTPATIENT
Start: 2023-11-09

## 2024-08-25 DIAGNOSIS — E03.9 HYPOTHYROIDISM, UNSPECIFIED: ICD-10-CM

## 2024-08-25 RX ORDER — LEVOTHYROXINE SODIUM 100 UG/1
TABLET ORAL
Qty: 90 TABLET | Refills: 0 | Status: SHIPPED | OUTPATIENT
Start: 2024-08-25

## 2024-09-05 ENCOUNTER — HOSPITAL ENCOUNTER (INPATIENT)
Facility: HOSPITAL | Age: 54
LOS: 1 days | Discharge: HOME | DRG: 069 | End: 2024-09-06
Attending: EMERGENCY MEDICINE | Admitting: FAMILY MEDICINE
Payer: COMMERCIAL

## 2024-09-05 ENCOUNTER — APPOINTMENT (OUTPATIENT)
Facility: HOSPITAL | Age: 54
DRG: 069 | End: 2024-09-05
Payer: COMMERCIAL

## 2024-09-05 DIAGNOSIS — G45.9 TIA (TRANSIENT ISCHEMIC ATTACK): Primary | ICD-10-CM

## 2024-09-05 DIAGNOSIS — I63.9 ACUTE CVA (CEREBROVASCULAR ACCIDENT) (HCC): ICD-10-CM

## 2024-09-05 LAB
ALBUMIN SERPL-MCNC: 3.9 G/DL (ref 3.5–5)
ALBUMIN/GLOB SERPL: 1 (ref 1.1–2.2)
ALP SERPL-CCNC: 128 U/L (ref 45–117)
ALT SERPL-CCNC: 20 U/L (ref 12–78)
ANION GAP SERPL CALC-SCNC: 8 MMOL/L (ref 2–12)
AST SERPL-CCNC: 16 U/L (ref 15–37)
BASOPHILS # BLD: 0 K/UL (ref 0–0.1)
BASOPHILS NFR BLD: 1 % (ref 0–1)
BILIRUB SERPL-MCNC: 0.5 MG/DL (ref 0.2–1)
BUN SERPL-MCNC: 17 MG/DL (ref 6–20)
BUN/CREAT SERPL: 16 (ref 12–20)
CALCIUM SERPL-MCNC: 9.1 MG/DL (ref 8.5–10.1)
CHLORIDE SERPL-SCNC: 102 MMOL/L (ref 97–108)
CO2 SERPL-SCNC: 30 MMOL/L (ref 21–32)
CREAT SERPL-MCNC: 1.04 MG/DL (ref 0.55–1.02)
DIFFERENTIAL METHOD BLD: ABNORMAL
EOSINOPHIL # BLD: 0.1 K/UL (ref 0–0.4)
EOSINOPHIL NFR BLD: 2 % (ref 0–7)
ERYTHROCYTE [DISTWIDTH] IN BLOOD BY AUTOMATED COUNT: 15.2 % (ref 11.5–14.5)
GLOBULIN SER CALC-MCNC: 4 G/DL (ref 2–4)
GLUCOSE SERPL-MCNC: 116 MG/DL (ref 65–100)
HCT VFR BLD AUTO: 37.6 % (ref 35–47)
HGB BLD-MCNC: 12 G/DL (ref 11.5–16)
IMM GRANULOCYTES # BLD AUTO: 0 K/UL (ref 0–0.04)
IMM GRANULOCYTES NFR BLD AUTO: 0 % (ref 0–0.5)
LYMPHOCYTES # BLD: 2.7 K/UL (ref 0.8–3.5)
LYMPHOCYTES NFR BLD: 50 % (ref 12–49)
MAGNESIUM SERPL-MCNC: 2.2 MG/DL (ref 1.6–2.4)
MCH RBC QN AUTO: 26.1 PG (ref 26–34)
MCHC RBC AUTO-ENTMCNC: 31.9 G/DL (ref 30–36.5)
MCV RBC AUTO: 81.7 FL (ref 80–99)
MONOCYTES # BLD: 0.3 K/UL (ref 0–1)
MONOCYTES NFR BLD: 5 % (ref 5–13)
NEUTS SEG # BLD: 2.2 K/UL (ref 1.8–8)
NEUTS SEG NFR BLD: 42 % (ref 32–75)
NRBC # BLD: 0 K/UL (ref 0–0.01)
NRBC BLD-RTO: 0 PER 100 WBC
PLATELET # BLD AUTO: 300 K/UL (ref 150–400)
PMV BLD AUTO: 9.4 FL (ref 8.9–12.9)
POTASSIUM SERPL-SCNC: 4 MMOL/L (ref 3.5–5.1)
PROT SERPL-MCNC: 7.9 G/DL (ref 6.4–8.2)
RBC # BLD AUTO: 4.6 M/UL (ref 3.8–5.2)
SODIUM SERPL-SCNC: 140 MMOL/L (ref 136–145)
TROPONIN I SERPL HS-MCNC: 5 NG/L (ref 0–51)
WBC # BLD AUTO: 5.4 K/UL (ref 3.6–11)

## 2024-09-05 PROCEDURE — 99285 EMERGENCY DEPT VISIT HI MDM: CPT

## 2024-09-05 PROCEDURE — 6370000000 HC RX 637 (ALT 250 FOR IP): Performed by: PHYSICIAN ASSISTANT

## 2024-09-05 PROCEDURE — G0378 HOSPITAL OBSERVATION PER HR: HCPCS

## 2024-09-05 PROCEDURE — 70450 CT HEAD/BRAIN W/O DYE: CPT

## 2024-09-05 PROCEDURE — 84484 ASSAY OF TROPONIN QUANT: CPT

## 2024-09-05 PROCEDURE — 80053 COMPREHEN METABOLIC PANEL: CPT

## 2024-09-05 PROCEDURE — 85025 COMPLETE CBC W/AUTO DIFF WBC: CPT

## 2024-09-05 PROCEDURE — 93005 ELECTROCARDIOGRAM TRACING: CPT | Performed by: EMERGENCY MEDICINE

## 2024-09-05 PROCEDURE — 83735 ASSAY OF MAGNESIUM: CPT

## 2024-09-05 RX ORDER — MECLIZINE HCL 12.5 MG 12.5 MG/1
12.5 TABLET ORAL ONCE
Status: DISCONTINUED | OUTPATIENT
Start: 2024-09-05 | End: 2024-09-05

## 2024-09-05 RX ORDER — ASPIRIN 325 MG
325 TABLET ORAL
Status: COMPLETED | OUTPATIENT
Start: 2024-09-05 | End: 2024-09-05

## 2024-09-05 RX ORDER — TIRZEPATIDE 5 MG/.5ML
INJECTION, SOLUTION SUBCUTANEOUS
COMMUNITY
Start: 2024-08-09

## 2024-09-05 RX ADMIN — ASPIRIN 325 MG: 325 TABLET ORAL at 20:23

## 2024-09-05 ASSESSMENT — LIFESTYLE VARIABLES
HOW MANY STANDARD DRINKS CONTAINING ALCOHOL DO YOU HAVE ON A TYPICAL DAY: PATIENT DOES NOT DRINK
HOW OFTEN DO YOU HAVE A DRINK CONTAINING ALCOHOL: NEVER

## 2024-09-05 ASSESSMENT — PAIN SCALES - GENERAL: PAINLEVEL_OUTOF10: 0

## 2024-09-05 NOTE — ED PROVIDER NOTES
[facility] EMERGENCY CTR  EMERGENCY DEPARTMENT ENCOUNTER      Pt Name: Dina Lora  MRN: [id number]  Birthdate 1970  Date of evaluation: 9/5/2024  Provider: Lenka Kumar PA-C    CHIEF COMPLAINT       Chief Complaint   Patient presents with    Chest Pain    Dizziness         HISTORY OF PRESENT ILLNESS   (Location/Symptom, Timing/Onset, Context/Setting, Quality, Duration, Modifying Factors, Severity)  Note limiting factors.   The patient is a 54-year-old female presents emergency department with multiple concerns.  Initially, the patient presented with dizziness, worse with movement of the head.  That began earlier today, and has been persistent throughout the day.  Although it has waxed and waned.  She also had a heavy feeling on her left face.  She is unsure when this began, she has sensation, she has full movement of her muscles of the face, but it feels heavy on the left maxillary area.    Also, after she was placed in a treatment room, she began to say that she had numbness and tingling of the left foot, this is not unusual for her, she has a chronic issue with this with numbness and tingling intermittently of left upper extremity left lower extremity right lower extremity.  It was unusual because it occurred in conjunction with her dizziness.  It began at approximately 230.    She also had a sharp pain in her mid chest, this also lasted seconds, and was very early this morning.    She denies any shortness of breath, dyspnea, symptoms of illness.  She denies any seasonal allergy symptoms, no ear pain or discomfort.    She states the dizziness is not positional, with supine to sit, sit to stand.  However she initially noticed the dizziness when she was going from sit to stand while at work today, to walk to the bathroom.    She denies any focal weakness, but feels weak globally. She also feels not herself, and is difficult to explain this. She feels weak and that she is having difficulty ambulating due to

## 2024-09-05 NOTE — ED TRIAGE NOTES
Pt c/o dizziness, worse when she moves her head. Pt states that this morning that she had a left sided CP. Pt also had episode of numbness at approx 1430 today in her LUE and LLE. Pt currently feels \"just dizzy and weak\". Pt DENIES current sensory deficits.

## 2024-09-06 ENCOUNTER — APPOINTMENT (OUTPATIENT)
Facility: HOSPITAL | Age: 54
DRG: 069 | End: 2024-09-06
Payer: COMMERCIAL

## 2024-09-06 ENCOUNTER — APPOINTMENT (OUTPATIENT)
Facility: HOSPITAL | Age: 54
DRG: 069 | End: 2024-09-06
Attending: INTERNAL MEDICINE
Payer: COMMERCIAL

## 2024-09-06 VITALS
HEIGHT: 65 IN | DIASTOLIC BLOOD PRESSURE: 72 MMHG | SYSTOLIC BLOOD PRESSURE: 111 MMHG | HEART RATE: 70 BPM | RESPIRATION RATE: 21 BRPM | WEIGHT: 163.58 LBS | OXYGEN SATURATION: 98 % | TEMPERATURE: 97.8 F | BODY MASS INDEX: 27.25 KG/M2

## 2024-09-06 PROBLEM — I63.9 ACUTE CVA (CEREBROVASCULAR ACCIDENT) (HCC): Status: RESOLVED | Noted: 2024-09-06 | Resolved: 2024-09-06

## 2024-09-06 PROBLEM — R20.0 LEFT SIDED NUMBNESS: Status: RESOLVED | Noted: 2024-09-06 | Resolved: 2024-09-06

## 2024-09-06 PROBLEM — R42 VERTIGO: Status: RESOLVED | Noted: 2024-09-06 | Resolved: 2024-09-06

## 2024-09-06 PROBLEM — I63.9 ACUTE CVA (CEREBROVASCULAR ACCIDENT) (HCC): Status: ACTIVE | Noted: 2024-09-06

## 2024-09-06 PROBLEM — R07.9 CHEST PAIN: Status: RESOLVED | Noted: 2024-09-06 | Resolved: 2024-09-06

## 2024-09-06 PROBLEM — R07.9 CHEST PAIN: Status: ACTIVE | Noted: 2024-09-06

## 2024-09-06 PROBLEM — R20.0 LEFT SIDED NUMBNESS: Status: ACTIVE | Noted: 2024-09-06

## 2024-09-06 PROBLEM — R42 VERTIGO: Status: ACTIVE | Noted: 2024-09-06

## 2024-09-06 PROBLEM — R42 DIZZINESS: Status: ACTIVE | Noted: 2024-09-06

## 2024-09-06 PROBLEM — G45.9 TIA (TRANSIENT ISCHEMIC ATTACK): Status: RESOLVED | Noted: 2024-09-05 | Resolved: 2024-09-06

## 2024-09-06 PROBLEM — R42 DIZZINESS: Status: RESOLVED | Noted: 2024-09-06 | Resolved: 2024-09-06

## 2024-09-06 LAB
ALBUMIN SERPL-MCNC: 3.3 G/DL (ref 3.5–5)
ALBUMIN/GLOB SERPL: 0.9 (ref 1.1–2.2)
ALP SERPL-CCNC: 116 U/L (ref 45–117)
ALT SERPL-CCNC: 17 U/L (ref 12–78)
AMPHET UR QL SCN: NEGATIVE
ANION GAP SERPL CALC-SCNC: 3 MMOL/L (ref 2–12)
APPEARANCE UR: CLEAR
AST SERPL-CCNC: 13 U/L (ref 15–37)
BACTERIA URNS QL MICRO: NEGATIVE /HPF
BARBITURATES UR QL SCN: NEGATIVE
BASOPHILS # BLD: 0 K/UL (ref 0–0.1)
BASOPHILS NFR BLD: 1 % (ref 0–1)
BENZODIAZ UR QL: NEGATIVE
BILIRUB SERPL-MCNC: 0.6 MG/DL (ref 0.2–1)
BILIRUB UR QL: NEGATIVE
BUN SERPL-MCNC: 15 MG/DL (ref 6–20)
BUN/CREAT SERPL: 16 (ref 12–20)
CALCIUM SERPL-MCNC: 9 MG/DL (ref 8.5–10.1)
CANNABINOIDS UR QL SCN: NEGATIVE
CHLORIDE SERPL-SCNC: 108 MMOL/L (ref 97–108)
CHOLEST SERPL-MCNC: 220 MG/DL
CO2 SERPL-SCNC: 29 MMOL/L (ref 21–32)
COCAINE UR QL SCN: NEGATIVE
COLOR UR: ABNORMAL
CREAT SERPL-MCNC: 0.92 MG/DL (ref 0.55–1.02)
CRP SERPL-MCNC: 0.34 MG/DL (ref 0–0.3)
D DIMER PPP FEU-MCNC: 0.44 MG/L FEU (ref 0–0.65)
DIFFERENTIAL METHOD BLD: ABNORMAL
ECHO AO ROOT DIAM: 3 CM
ECHO AO ROOT INDEX: 1.65 CM/M2
ECHO AV AREA PEAK VELOCITY: 2.8 CM2
ECHO AV AREA VTI: 3.2 CM2
ECHO AV AREA/BSA PEAK VELOCITY: 1.5 CM2/M2
ECHO AV AREA/BSA VTI: 1.8 CM2/M2
ECHO AV MEAN GRADIENT: 3 MMHG
ECHO AV MEAN VELOCITY: 0.8 M/S
ECHO AV PEAK GRADIENT: 5 MMHG
ECHO AV PEAK VELOCITY: 1.1 M/S
ECHO AV VELOCITY RATIO: 0.91
ECHO AV VTI: 19.7 CM
ECHO BSA: 1.84 M2
ECHO LA DIAMETER INDEX: 1.76 CM/M2
ECHO LA DIAMETER: 3.2 CM
ECHO LA TO AORTIC ROOT RATIO: 1.07
ECHO LA VOL A-L A2C: 21 ML (ref 22–52)
ECHO LA VOL A-L A4C: 19 ML (ref 22–52)
ECHO LA VOL MOD A2C: 21 ML (ref 22–52)
ECHO LA VOL MOD A4C: 19 ML (ref 22–52)
ECHO LA VOLUME AREA LENGTH: 21 ML
ECHO LA VOLUME INDEX A-L A2C: 12 ML/M2 (ref 16–34)
ECHO LA VOLUME INDEX A-L A4C: 10 ML/M2 (ref 16–34)
ECHO LA VOLUME INDEX AREA LENGTH: 12 ML/M2 (ref 16–34)
ECHO LA VOLUME INDEX MOD A2C: 12 ML/M2 (ref 16–34)
ECHO LA VOLUME INDEX MOD A4C: 10 ML/M2 (ref 16–34)
ECHO LV E' LATERAL VELOCITY: 8 CM/S
ECHO LV E' SEPTAL VELOCITY: 7 CM/S
ECHO LV EDV A2C: 46 ML
ECHO LV EDV A4C: 62 ML
ECHO LV EDV BP: 54 ML (ref 56–104)
ECHO LV EDV INDEX A4C: 34 ML/M2
ECHO LV EDV INDEX BP: 30 ML/M2
ECHO LV EDV NDEX A2C: 25 ML/M2
ECHO LV EF PHYSICIAN: 60 %
ECHO LV EJECTION FRACTION A2C: 61 %
ECHO LV EJECTION FRACTION A4C: 43 %
ECHO LV ESV A2C: 18 ML
ECHO LV ESV A4C: 35 ML
ECHO LV ESV BP: 28 ML (ref 19–49)
ECHO LV ESV INDEX A2C: 10 ML/M2
ECHO LV ESV INDEX A4C: 19 ML/M2
ECHO LV ESV INDEX BP: 15 ML/M2
ECHO LV FRACTIONAL SHORTENING: 31 % (ref 28–44)
ECHO LV INTERNAL DIMENSION DIASTOLE INDEX: 2.47 CM/M2
ECHO LV INTERNAL DIMENSION DIASTOLIC: 4.5 CM (ref 3.9–5.3)
ECHO LV INTERNAL DIMENSION SYSTOLIC INDEX: 1.7 CM/M2
ECHO LV INTERNAL DIMENSION SYSTOLIC: 3.1 CM
ECHO LV IVSD: 0.9 CM (ref 0.6–0.9)
ECHO LV MASS 2D: 132.8 G (ref 67–162)
ECHO LV MASS INDEX 2D: 73 G/M2 (ref 43–95)
ECHO LV POSTERIOR WALL DIASTOLIC: 0.9 CM (ref 0.6–0.9)
ECHO LV RELATIVE WALL THICKNESS RATIO: 0.4
ECHO LVOT AREA: 2.8 CM2
ECHO LVOT AV VTI INDEX: 1.09
ECHO LVOT DIAM: 1.9 CM
ECHO LVOT MEAN GRADIENT: 2 MMHG
ECHO LVOT PEAK GRADIENT: 4 MMHG
ECHO LVOT PEAK VELOCITY: 1 M/S
ECHO LVOT STROKE VOLUME INDEX: 33.3 ML/M2
ECHO LVOT SV: 60.6 ML
ECHO LVOT VTI: 21.4 CM
ECHO MV A VELOCITY: 0.72 M/S
ECHO MV AREA PHT: 4.7 CM2
ECHO MV E DECELERATION TIME (DT): 160.8 MS
ECHO MV E VELOCITY: 0.44 M/S
ECHO MV E/A RATIO: 0.61
ECHO MV E/E' LATERAL: 5.5
ECHO MV E/E' RATIO (AVERAGED): 5.89
ECHO MV E/E' SEPTAL: 6.29
ECHO MV PRESSURE HALF TIME (PHT): 46.6 MS
ECHO PV MAX VELOCITY: 0.8 M/S
ECHO PV PEAK GRADIENT: 2 MMHG
ECHO RV TAPSE: 1.7 CM (ref 1.7–?)
EKG ATRIAL RATE: 76 BPM
EKG DIAGNOSIS: NORMAL
EKG P AXIS: 68 DEGREES
EKG P-R INTERVAL: 160 MS
EKG Q-T INTERVAL: 372 MS
EKG QRS DURATION: 76 MS
EKG QTC CALCULATION (BAZETT): 418 MS
EKG R AXIS: 5 DEGREES
EKG T AXIS: 39 DEGREES
EKG VENTRICULAR RATE: 76 BPM
EOSINOPHIL # BLD: 0.1 K/UL (ref 0–0.4)
EOSINOPHIL NFR BLD: 2 % (ref 0–7)
EPITH CASTS URNS QL MICRO: ABNORMAL /LPF
ERYTHROCYTE [DISTWIDTH] IN BLOOD BY AUTOMATED COUNT: 15.2 % (ref 11.5–14.5)
EST. AVERAGE GLUCOSE BLD GHB EST-MCNC: 105 MG/DL
FOLATE SERPL-MCNC: 11.6 NG/ML (ref 5–21)
GLOBULIN SER CALC-MCNC: 3.5 G/DL (ref 2–4)
GLUCOSE SERPL-MCNC: 82 MG/DL (ref 65–100)
GLUCOSE UR STRIP.AUTO-MCNC: NEGATIVE MG/DL
HBA1C MFR BLD: 5.3 % (ref 4–5.6)
HCT VFR BLD AUTO: 35.4 % (ref 35–47)
HDLC SERPL-MCNC: 68 MG/DL
HDLC SERPL: 3.2 (ref 0–5)
HGB BLD-MCNC: 11.6 G/DL (ref 11.5–16)
HGB UR QL STRIP: NEGATIVE
HYALINE CASTS URNS QL MICRO: ABNORMAL /LPF (ref 0–5)
IMM GRANULOCYTES # BLD AUTO: 0 K/UL (ref 0–0.04)
IMM GRANULOCYTES NFR BLD AUTO: 0 % (ref 0–0.5)
KETONES UR QL STRIP.AUTO: ABNORMAL MG/DL
LDLC SERPL CALC-MCNC: 141.4 MG/DL (ref 0–100)
LEUKOCYTE ESTERASE UR QL STRIP.AUTO: ABNORMAL
LIPASE SERPL-CCNC: 41 U/L (ref 13–75)
LYMPHOCYTES # BLD: 2 K/UL (ref 0.8–3.5)
LYMPHOCYTES NFR BLD: 47 % (ref 12–49)
Lab: NORMAL
MAGNESIUM SERPL-MCNC: 2.2 MG/DL (ref 1.6–2.4)
MCH RBC QN AUTO: 26.6 PG (ref 26–34)
MCHC RBC AUTO-ENTMCNC: 32.8 G/DL (ref 30–36.5)
MCV RBC AUTO: 81.2 FL (ref 80–99)
METHADONE UR QL: NEGATIVE
MONOCYTES # BLD: 0.4 K/UL (ref 0–1)
MONOCYTES NFR BLD: 9 % (ref 5–13)
NEUTS SEG # BLD: 1.7 K/UL (ref 1.8–8)
NEUTS SEG NFR BLD: 41 % (ref 32–75)
NITRITE UR QL STRIP.AUTO: NEGATIVE
NRBC # BLD: 0 K/UL (ref 0–0.01)
NRBC BLD-RTO: 0 PER 100 WBC
OPIATES UR QL: NEGATIVE
PCP UR QL: NEGATIVE
PH UR STRIP: 5.5 (ref 5–8)
PHOSPHATE SERPL-MCNC: 3.3 MG/DL (ref 2.6–4.7)
PLATELET # BLD AUTO: 270 K/UL (ref 150–400)
PMV BLD AUTO: 9.4 FL (ref 8.9–12.9)
POTASSIUM SERPL-SCNC: 4 MMOL/L (ref 3.5–5.1)
PROT SERPL-MCNC: 6.8 G/DL (ref 6.4–8.2)
PROT UR STRIP-MCNC: NEGATIVE MG/DL
RBC # BLD AUTO: 4.36 M/UL (ref 3.8–5.2)
RBC #/AREA URNS HPF: ABNORMAL /HPF (ref 0–5)
SODIUM SERPL-SCNC: 140 MMOL/L (ref 136–145)
SP GR UR REFRACTOMETRY: 1.02 (ref 1–1.03)
TRIGL SERPL-MCNC: 53 MG/DL
TROPONIN I SERPL HS-MCNC: 4 NG/L (ref 0–51)
TROPONIN I SERPL HS-MCNC: 4 NG/L (ref 0–51)
TSH SERPL DL<=0.05 MIU/L-ACNC: 0.8 UIU/ML (ref 0.36–3.74)
URINE CULTURE IF INDICATED: ABNORMAL
UROBILINOGEN UR QL STRIP.AUTO: 0.2 EU/DL (ref 0.2–1)
VIT B12 SERPL-MCNC: 855 PG/ML (ref 193–986)
VLDLC SERPL CALC-MCNC: 10.6 MG/DL
WBC # BLD AUTO: 4.2 K/UL (ref 3.6–11)
WBC URNS QL MICRO: ABNORMAL /HPF (ref 0–4)

## 2024-09-06 PROCEDURE — 6370000000 HC RX 637 (ALT 250 FOR IP): Performed by: INTERNAL MEDICINE

## 2024-09-06 PROCEDURE — 82746 ASSAY OF FOLIC ACID SERUM: CPT

## 2024-09-06 PROCEDURE — 80307 DRUG TEST PRSMV CHEM ANLYZR: CPT

## 2024-09-06 PROCEDURE — 82607 VITAMIN B-12: CPT

## 2024-09-06 PROCEDURE — 96372 THER/PROPH/DIAG INJ SC/IM: CPT

## 2024-09-06 PROCEDURE — 83690 ASSAY OF LIPASE: CPT

## 2024-09-06 PROCEDURE — 86140 C-REACTIVE PROTEIN: CPT

## 2024-09-06 PROCEDURE — 97530 THERAPEUTIC ACTIVITIES: CPT

## 2024-09-06 PROCEDURE — 83036 HEMOGLOBIN GLYCOSYLATED A1C: CPT

## 2024-09-06 PROCEDURE — 70547 MR ANGIOGRAPHY NECK W/O DYE: CPT

## 2024-09-06 PROCEDURE — 2580000003 HC RX 258: Performed by: INTERNAL MEDICINE

## 2024-09-06 PROCEDURE — 71045 X-RAY EXAM CHEST 1 VIEW: CPT

## 2024-09-06 PROCEDURE — 70551 MRI BRAIN STEM W/O DYE: CPT

## 2024-09-06 PROCEDURE — 70544 MR ANGIOGRAPHY HEAD W/O DYE: CPT

## 2024-09-06 PROCEDURE — 93306 TTE W/DOPPLER COMPLETE: CPT

## 2024-09-06 PROCEDURE — 97161 PT EVAL LOW COMPLEX 20 MIN: CPT

## 2024-09-06 PROCEDURE — 6360000002 HC RX W HCPCS: Performed by: INTERNAL MEDICINE

## 2024-09-06 PROCEDURE — G0378 HOSPITAL OBSERVATION PER HR: HCPCS

## 2024-09-06 PROCEDURE — 1100000003 HC PRIVATE W/ TELEMETRY

## 2024-09-06 PROCEDURE — 96374 THER/PROPH/DIAG INJ IV PUSH: CPT

## 2024-09-06 PROCEDURE — 92610 EVALUATE SWALLOWING FUNCTION: CPT

## 2024-09-06 PROCEDURE — 84443 ASSAY THYROID STIM HORMONE: CPT

## 2024-09-06 PROCEDURE — 80061 LIPID PANEL: CPT

## 2024-09-06 PROCEDURE — 85379 FIBRIN DEGRADATION QUANT: CPT

## 2024-09-06 PROCEDURE — 99223 1ST HOSP IP/OBS HIGH 75: CPT | Performed by: PSYCHIATRY & NEUROLOGY

## 2024-09-06 PROCEDURE — 85025 COMPLETE CBC W/AUTO DIFF WBC: CPT

## 2024-09-06 PROCEDURE — 99222 1ST HOSP IP/OBS MODERATE 55: CPT | Performed by: INTERNAL MEDICINE

## 2024-09-06 PROCEDURE — 84100 ASSAY OF PHOSPHORUS: CPT

## 2024-09-06 PROCEDURE — 80053 COMPREHEN METABOLIC PANEL: CPT

## 2024-09-06 PROCEDURE — 83735 ASSAY OF MAGNESIUM: CPT

## 2024-09-06 PROCEDURE — 81001 URINALYSIS AUTO W/SCOPE: CPT

## 2024-09-06 PROCEDURE — 84484 ASSAY OF TROPONIN QUANT: CPT

## 2024-09-06 PROCEDURE — 36415 COLL VENOUS BLD VENIPUNCTURE: CPT

## 2024-09-06 RX ORDER — ATORVASTATIN CALCIUM 40 MG/1
40 TABLET, FILM COATED ORAL NIGHTLY
Status: DISCONTINUED | OUTPATIENT
Start: 2024-09-06 | End: 2024-09-06 | Stop reason: HOSPADM

## 2024-09-06 RX ORDER — SODIUM CHLORIDE 0.9 % (FLUSH) 0.9 %
5-40 SYRINGE (ML) INJECTION PRN
Status: DISCONTINUED | OUTPATIENT
Start: 2024-09-06 | End: 2024-09-06 | Stop reason: HOSPADM

## 2024-09-06 RX ORDER — LEVOTHYROXINE SODIUM 100 UG/1
100 TABLET ORAL DAILY
Status: DISCONTINUED | OUTPATIENT
Start: 2024-09-06 | End: 2024-09-06 | Stop reason: HOSPADM

## 2024-09-06 RX ORDER — ASPIRIN 300 MG/1
300 SUPPOSITORY RECTAL DAILY
Status: DISCONTINUED | OUTPATIENT
Start: 2024-09-06 | End: 2024-09-06

## 2024-09-06 RX ORDER — ENOXAPARIN SODIUM 100 MG/ML
40 INJECTION SUBCUTANEOUS DAILY
Status: DISCONTINUED | OUTPATIENT
Start: 2024-09-06 | End: 2024-09-06 | Stop reason: HOSPADM

## 2024-09-06 RX ORDER — LORAZEPAM 2 MG/ML
1 INJECTION INTRAMUSCULAR ONCE
Status: COMPLETED | OUTPATIENT
Start: 2024-09-06 | End: 2024-09-06

## 2024-09-06 RX ORDER — SODIUM CHLORIDE 0.9 % (FLUSH) 0.9 %
5-40 SYRINGE (ML) INJECTION EVERY 12 HOURS SCHEDULED
Status: DISCONTINUED | OUTPATIENT
Start: 2024-09-06 | End: 2024-09-06 | Stop reason: HOSPADM

## 2024-09-06 RX ORDER — MECLIZINE HYDROCHLORIDE 25 MG/1
12.5 TABLET ORAL 3 TIMES DAILY PRN
Qty: 15 TABLET | Refills: 0 | Status: SHIPPED | OUTPATIENT
Start: 2024-09-06 | End: 2024-09-16

## 2024-09-06 RX ORDER — ASPIRIN 81 MG/1
81 TABLET, CHEWABLE ORAL DAILY
Status: DISCONTINUED | OUTPATIENT
Start: 2024-09-06 | End: 2024-09-06

## 2024-09-06 RX ORDER — ATORVASTATIN CALCIUM 40 MG/1
40 TABLET, FILM COATED ORAL NIGHTLY
Qty: 30 TABLET | Refills: 3 | Status: SHIPPED | OUTPATIENT
Start: 2024-09-06

## 2024-09-06 RX ORDER — SODIUM CHLORIDE 9 MG/ML
INJECTION, SOLUTION INTRAVENOUS CONTINUOUS
Status: DISCONTINUED | OUTPATIENT
Start: 2024-09-06 | End: 2024-09-06 | Stop reason: HOSPADM

## 2024-09-06 RX ORDER — SODIUM CHLORIDE 9 MG/ML
INJECTION, SOLUTION INTRAVENOUS PRN
Status: DISCONTINUED | OUTPATIENT
Start: 2024-09-06 | End: 2024-09-06 | Stop reason: HOSPADM

## 2024-09-06 RX ORDER — ONDANSETRON 2 MG/ML
4 INJECTION INTRAMUSCULAR; INTRAVENOUS EVERY 6 HOURS PRN
Status: DISCONTINUED | OUTPATIENT
Start: 2024-09-06 | End: 2024-09-06 | Stop reason: HOSPADM

## 2024-09-06 RX ORDER — ATORVASTATIN CALCIUM 40 MG/1
80 TABLET, FILM COATED ORAL NIGHTLY
Status: DISCONTINUED | OUTPATIENT
Start: 2024-09-06 | End: 2024-09-06

## 2024-09-06 RX ORDER — POLYETHYLENE GLYCOL 3350 17 G/17G
17 POWDER, FOR SOLUTION ORAL DAILY PRN
Status: DISCONTINUED | OUTPATIENT
Start: 2024-09-06 | End: 2024-09-06 | Stop reason: HOSPADM

## 2024-09-06 RX ORDER — ONDANSETRON 4 MG/1
4 TABLET, ORALLY DISINTEGRATING ORAL EVERY 8 HOURS PRN
Status: DISCONTINUED | OUTPATIENT
Start: 2024-09-06 | End: 2024-09-06 | Stop reason: HOSPADM

## 2024-09-06 RX ADMIN — SODIUM CHLORIDE: 9 INJECTION, SOLUTION INTRAVENOUS at 07:12

## 2024-09-06 RX ADMIN — ENOXAPARIN SODIUM 40 MG: 100 INJECTION SUBCUTANEOUS at 09:20

## 2024-09-06 RX ADMIN — SODIUM CHLORIDE, PRESERVATIVE FREE 10 ML: 5 INJECTION INTRAVENOUS at 09:25

## 2024-09-06 RX ADMIN — LORAZEPAM 1 MG: 2 INJECTION INTRAMUSCULAR; INTRAVENOUS at 12:00

## 2024-09-06 RX ADMIN — ASPIRIN 81 MG CHEWABLE TABLET 81 MG: 81 TABLET CHEWABLE at 09:20

## 2024-09-06 SDOH — HEALTH STABILITY: PHYSICAL HEALTH: ON AVERAGE, HOW MANY MINUTES DO YOU ENGAGE IN EXERCISE AT THIS LEVEL?: 30 MIN

## 2024-09-06 SDOH — SOCIAL STABILITY: SOCIAL INSECURITY
WITHIN THE LAST YEAR, HAVE TO BEEN RAPED OR FORCED TO HAVE ANY KIND OF SEXUAL ACTIVITY BY YOUR PARTNER OR EX-PARTNER?: NO

## 2024-09-06 SDOH — HEALTH STABILITY: MENTAL HEALTH: HOW MANY STANDARD DRINKS CONTAINING ALCOHOL DO YOU HAVE ON A TYPICAL DAY?: PATIENT DOES NOT DRINK

## 2024-09-06 SDOH — HEALTH STABILITY: MENTAL HEALTH: HOW OFTEN DO YOU HAVE A DRINK CONTAINING ALCOHOL?: NEVER

## 2024-09-06 SDOH — ECONOMIC STABILITY: FOOD INSECURITY: WITHIN THE PAST 12 MONTHS, THE FOOD YOU BOUGHT JUST DIDN'T LAST AND YOU DIDN'T HAVE MONEY TO GET MORE.: NEVER TRUE

## 2024-09-06 SDOH — ECONOMIC STABILITY: FOOD INSECURITY: WITHIN THE PAST 12 MONTHS, YOU WORRIED THAT YOUR FOOD WOULD RUN OUT BEFORE YOU GOT MONEY TO BUY MORE.: NEVER TRUE

## 2024-09-06 SDOH — SOCIAL STABILITY: SOCIAL INSECURITY
WITHIN THE LAST YEAR, HAVE YOU BEEN KICKED, HIT, SLAPPED, OR OTHERWISE PHYSICALLY HURT BY YOUR PARTNER OR EX-PARTNER?: NO

## 2024-09-06 SDOH — SOCIAL STABILITY: SOCIAL INSECURITY: WITHIN THE LAST YEAR, HAVE YOU BEEN AFRAID OF YOUR PARTNER OR EX-PARTNER?: NO

## 2024-09-06 SDOH — SOCIAL STABILITY: SOCIAL NETWORK
DO YOU BELONG TO ANY CLUBS OR ORGANIZATIONS SUCH AS CHURCH GROUPS UNIONS, FRATERNAL OR ATHLETIC GROUPS, OR SCHOOL GROUPS?: YES

## 2024-09-06 SDOH — SOCIAL STABILITY: SOCIAL NETWORK: HOW OFTEN DO YOU ATTEND CHURCH OR RELIGIOUS SERVICES?: MORE THAN 4 TIMES PER YEAR

## 2024-09-06 SDOH — ECONOMIC STABILITY: INCOME INSECURITY: IN THE LAST 12 MONTHS, WAS THERE A TIME WHEN YOU WERE NOT ABLE TO PAY THE MORTGAGE OR RENT ON TIME?: NO

## 2024-09-06 SDOH — SOCIAL STABILITY: SOCIAL NETWORK: HOW OFTEN DO YOU GET TOGETHER WITH FRIENDS OR RELATIVES?: MORE THAN THREE TIMES A WEEK

## 2024-09-06 SDOH — ECONOMIC STABILITY: TRANSPORTATION INSECURITY
IN THE PAST 12 MONTHS, HAS LACK OF TRANSPORTATION KEPT YOU FROM MEETINGS, WORK, OR FROM GETTING THINGS NEEDED FOR DAILY LIVING?: NO

## 2024-09-06 SDOH — HEALTH STABILITY: MENTAL HEALTH
STRESS IS WHEN SOMEONE FEELS TENSE, NERVOUS, ANXIOUS, OR CAN'T SLEEP AT NIGHT BECAUSE THEIR MIND IS TROUBLED. HOW STRESSED ARE YOU?: ONLY A LITTLE

## 2024-09-06 SDOH — SOCIAL STABILITY: SOCIAL NETWORK: ARE YOU MARRIED, WIDOWED, DIVORCED, SEPARATED, NEVER MARRIED, OR LIVING WITH A PARTNER?: MARRIED

## 2024-09-06 SDOH — SOCIAL STABILITY: SOCIAL INSECURITY: WITHIN THE LAST YEAR, HAVE YOU BEEN HUMILIATED OR EMOTIONALLY ABUSED IN OTHER WAYS BY YOUR PARTNER OR EX-PARTNER?: NO

## 2024-09-06 SDOH — HEALTH STABILITY: PHYSICAL HEALTH: ON AVERAGE, HOW MANY DAYS PER WEEK DO YOU ENGAGE IN MODERATE TO STRENUOUS EXERCISE (LIKE A BRISK WALK)?: 5 DAYS

## 2024-09-06 SDOH — SOCIAL STABILITY: SOCIAL NETWORK
IN A TYPICAL WEEK, HOW MANY TIMES DO YOU TALK ON THE PHONE WITH FAMILY, FRIENDS, OR NEIGHBORS?: MORE THAN THREE TIMES A WEEK

## 2024-09-06 SDOH — SOCIAL STABILITY: SOCIAL NETWORK: HOW OFTEN DO YOU ATTENT MEETINGS OF THE CLUB OR ORGANIZATION YOU BELONG TO?: 1 TO 4 TIMES PER YEAR

## 2024-09-06 SDOH — ECONOMIC STABILITY: TRANSPORTATION INSECURITY
IN THE PAST 12 MONTHS, HAS THE LACK OF TRANSPORTATION KEPT YOU FROM MEDICAL APPOINTMENTS OR FROM GETTING MEDICATIONS?: NO

## 2024-09-06 SDOH — ECONOMIC STABILITY: INCOME INSECURITY: HOW HARD IS IT FOR YOU TO PAY FOR THE VERY BASICS LIKE FOOD, HOUSING, MEDICAL CARE, AND HEATING?: NOT HARD AT ALL

## 2024-09-06 ASSESSMENT — PAIN SCALES - GENERAL: PAINLEVEL_OUTOF10: 0

## 2024-09-06 NOTE — ED NOTES
TRANSFER - OUT REPORT:    Verbal report given to MARION Cadet on Dina Lora  being transferred to [facility]ST[facility] Room 652 for routine progression of patient care       Report consisted of patient's Situation, Background, Assessment and   Recommendations(SBAR).     Information from the following report(s) Nurse Handoff Report, ED Encounter Summary, ED SBAR, MAR, Recent Results, and Cardiac Rhythm NSR  was reviewed with the receiving nurse.    [place] Fall Assessment:    Presents to emergency department  because of falls (Syncope, seizure, or loss of consciousness): No  Age > 70: No  Altered Mental Status, Intoxication with alcohol or substance confusion (Disorientation, impaired judgment, poor safety awaremess, or inability to follow instructions): No  Impaired Mobility: Ambulates or transfers with assistive devices or assistance; Unable to ambulate or transer.: No  Nursing Judgement: No          Lines:   Peripheral IV 09/05/24 Distal;Left;Anterior Basilic (Active)   Site Assessment Clean, dry & intact 09/05/24 1850   Line Status Blood return noted;Normal saline locked;Flushed 09/05/24 1850   Phlebitis Assessment No symptoms 09/05/24 1850   Infiltration Assessment 0 09/05/24 1850        Opportunity for questions and clarification was provided.      Patient transported with:  Monitor

## 2024-09-06 NOTE — PLAN OF CARE
Problem: Discharge Planning  Goal: Discharge to home or other facility with appropriate resources  Outcome: Adequate for Discharge  Flowsheets (Taken 9/6/2024 0800 by Cesario Oconnor, RN)  Discharge to home or other facility with appropriate resources: Identify barriers to discharge with patient and caregiver     Problem: Chronic Conditions and Co-morbidities  Goal: Patient's chronic conditions and co-morbidity symptoms are monitored and maintained or improved  Outcome: Adequate for Discharge  Flowsheets (Taken 9/6/2024 0800 by Cesario Oconnor, RN)  Care Plan - Patient's Chronic Conditions and Co-Morbidity Symptoms are Monitored and Maintained or Improved: Monitor and assess patient's chronic conditions and comorbid symptoms for stability, deterioration, or improvement

## 2024-09-06 NOTE — PLAN OF CARE
Problem: Discharge Planning  Goal: Discharge to home or other facility with appropriate resources  Outcome: Progressing  Flowsheets (Taken 9/5/2024 2230)  Discharge to home or other facility with appropriate resources: Identify barriers to discharge with patient and caregiver     Problem: Chronic Conditions and Co-morbidities  Goal: Patient's chronic conditions and co-morbidity symptoms are monitored and maintained or improved  Outcome: Progressing

## 2024-09-06 NOTE — PROGRESS NOTES
OT order received, chart reviewed. Witnessed patient ambulating with PT who also reported patient's independence with toileting, transfer, etc. Briefly discussed role of OT with patient. Her symptoms (dizziness) has resolved. Patient does not identify any ADL deficits or concerns, no strength or coordination changes. Acute OT services not indicated. Completing order  Pia Renteria OTR/BONG

## 2024-09-06 NOTE — CARE COORDINATION
Care Management Initial Assessment       RUR:  Readmission? No  1st IM letter given? No  1st  letter given: No    CM met with pt and her spouse at bedside.   She is alert and oriented x4. Demographics and insurance confirmed. Pt lives at home with her .   She is independent with ADL's. No DME owned or used. Pt has no history with home health or rehab services. Plan is to return home with family support. No needs identified. CM following.     presents with dizziness and chest pain        09/06/24 1235   Service Assessment   Patient Orientation Alert and Oriented   Cognition Alert   History Provided By Patient   Primary Caregiver Self   Accompanied By/Relationship Spouse Jass Lora   Support Systems Spouse/Significant Other   Patient's Healthcare Decision Maker is: Named in Scanned ACP Document   PCP Verified by CM Yes   Last Visit to PCP Within last 6 months   Prior Functional Level Independent in ADLs/IADLs   Current Functional Level Independent in ADLs/IADLs   Can patient return to prior living arrangement Yes   Ability to make needs known: Good   Family able to assist with home care needs: Yes   Would you like for me to discuss the discharge plan with any other family members/significant others, and if so, who? No   Financial Resources   (Tyler ZEPEDA)   Social/Functional History   Lives With Spouse   Type of Home House   ADL Assistance Independent   Ambulation Assistance Independent   Mode of Transportation Car   Discharge Planning   Living Arrangements Spouse/Significant Other       Kwasi Jhaveri RN, BSN, BSHCM  Case Management [facility]

## 2024-09-06 NOTE — PROGRESS NOTES
Speech LAnguage Pathology EVALUATION/DISCHARGE    Patient: Dina Lora (54 y.o. female)  Date: 9/6/2024  Primary Diagnosis: TIA (transient ischemic attack) [G45.9]       Precautions: Aspiration                    ASSESSMENT :  Pt is a 53 yo female presenting to the ED with dizziness and left leg and face tingling. She states that her symptoms are better.  CT negative and awaiting MRI. Pt is currently on a regular diet and thin liquids and denies any hx of dysphagia. RN reports she is doing great.    Pt with functional OME. Pt with functional swallow for all consistencies: thin, puree and regular. Pt able to self feed, functional mastication, bolus control and transit. Pt with timely swallow and no overt s/s of aspiration noted. Recommend she continue with a regular diet and thin liquids. SLP to sign off.  Please re-consult if change in status or concerns with MRI.     Patient will be discharged from skilled speech-language pathology services at this time.     PLAN :  Recommendations and Planned Interventions:  Diet: Regular and thin liquids  -upright for all po intake  -remain upright for at least 30 minutes after po intake     Acute SLP Services: No, patient will be discharged from acute skilled speech-language pathology at this time.    Discharge Recommendations: No, additional SLP treatment not indicated at discharge     SUBJECTIVE:   Patient stated, “hello.”    OBJECTIVE:     Past Medical History:   Diagnosis Date    Hypothyroid     Vitamin D deficiency    History reviewed. No pertinent surgical history.  Prior Level of Function/Home Situation:   Social/Functional History  Lives With: Spouse, Family  Type of Home: House  Home Layout: One level  Home Access: Stairs to enter with rails  Entrance Stairs - Number of Steps: 5  Entrance Stairs - Rails: Left  Bathroom Shower/Tub: Walk-in shower  Bathroom Equipment: None  Home Equipment: None  Has the patient had two or more falls in the past year or any fall with  Quality 226: Preventive Care And Screening: Tobacco Use: Screening And Cessation Intervention: Patient screened for tobacco use and is an ex/non-smoker Additional Notes: The E/M service provided during this visit was medically necessary, significant, and independent from the procedure(s) provided on the same date of service and included documented elements above and beyond the usual pre-, intra-, and post-operative work associated with the procedure(s). Quality 130: Documentation Of Current Medications In The Medical Record: Current Medications Documented Quality 47: Advance Care Plan: Advance care planning not documented, reason not otherwise specified. Detail Level: Detailed Quality 431: Preventive Care And Screening: Unhealthy Alcohol Use - Screening: Patient not identified as an unhealthy alcohol user when screened for unhealthy alcohol use using a systematic screening method

## 2024-09-06 NOTE — DISCHARGE SUMMARY
Discharge Summary       PATIENT ID: Dina Lora  MRN: [id number]   YOB: 1970    DATE OF ADMISSION: 9/5/2024  6:05 PM    DATE OF DISCHARGE: 9/6/24   PRIMARY CARE PROVIDER: Hung Benoit MD     ATTENDING PHYSICIAN: SHASHANK Santillan MD  DISCHARGING PROVIDER: Marilia Worley PA-C    To contact this individual call [phone] and ask the  to page.  If unavailable ask to be transferred the Adult Hospitalist Department.    CONSULTATIONS: IP CONSULT TO NEUROLOGY  IP CONSULT TO CARDIOLOGY    PROCEDURES/SURGERIES: * No surgery found *     ADMITTING DIAGNOSES & HOSPITAL COURSE:     Dina Lora is a 54 y.o. female with past medical history significant for hypothyroidism presented at [facility] emergency room at [facility] with dizziness and chest pain.  Patient symptoms started on the day of her presentation at the emergency room.  The dizziness is worse when the patient moves her head.  The chest pain is located at the left side of the chest, pressure-like, no radiation, no known aggravating or relieving factors, 4/10 in severity.  Patient has no history of coronary artery disease.  The dizziness is saturated with left upper and lower extremity numbness.  She has no prior history of a TIA or CVA.  CT of the head without contrast was done on arrival at the emergency room which did not show acute pathology.  She was referred to the hospitalist service for admission      9/6  Discussed with neuro and cardiology after MRI/MRA and echo resulted. Both have cleared for dc today  Return precautions provided. Patient verbalizes understanding and agrees with the plan.         DISCHARGE DIAGNOSES / PLAN:      Acute CVA POA - ruled out   Dizziness -- resolved   - MRI/MRA of the brain unremarkable   - MRA of the neck unremarkable  - TTE EF 60-65% no PFO or thrombus     - , Cholesterol 220   - A1c level 5.3%  - C-reactive protein 0.34  - B12 and folate levels WNR  - Neurology consult, cleared

## 2024-09-06 NOTE — PROGRESS NOTES
ischemic findings.     Review of Systems:    [x]All other systems reviewed and all negative except as written in HPI    [] Patient unable to provide secondary to condition    Past Medical History:   Diagnosis Date    Hypothyroid     Vitamin D deficiency      History reviewed. No pertinent surgical history.  Social Hx:  reports that she has never smoked. She has never used smokeless tobacco. She reports that she does not drink alcohol and does not use drugs.  Family Hx: family history includes High Cholesterol in her mother; Hypertension in her father; No Known Problems in her daughter, daughter, and sister.  No Known Allergies       OBJECTIVE:  Wt Readings from Last 3 Encounters:   09/06/24 74.2 kg (163 lb 9.3 oz)   11/02/23 87.5 kg (193 lb)   02/02/23 85.7 kg (189 lb)     Net IO Since Admission: No IO data has been entered for this period [09/06/24 1021]     Physical Exam:    Vitals:   Vitals:    09/06/24 0400 09/06/24 0525 09/06/24 0544 09/06/24 0558   BP:  111/80     Pulse: 79 84  62   Resp:  21     Temp:  98.2 °F (36.8 °C)     TempSrc:  Oral     SpO2:       Weight:   74.2 kg (163 lb 9.3 oz)    Height:         Telemetry: NSR    Gen: Well-developed, female in no acute distress  Neck: Supple,   Resp: No accessory muscle use, Clear breath sounds, No rales or rhonchi  Card: Regular Rate,Rhythm, Normal S1, S2, No murmurs, rubs or gallop. No thrills.   Abd:   Soft, non-tender, non-distended, BS+   MSK: No cyanosis  Skin: No rashes    Neuro: Moving all four extremities, follows commands appropriately  Psych:  Oriented to person, place, alert, Nml Affect  LE: No edema          Data Review:     Radiology:   XR Results (most recent):  CT Result (most recent):  CT HEAD WO CONTRAST 09/05/2024    Narrative  EXAM: CT HEAD WO CONTRAST    CLINICAL HISTORY: dizziness  INDICATION: dizziness  COMPARISON: 2022.  CT dose reduction was achieved through use of a standardized protocol tailored  for this examination and automatic  exposure control for dose modulation.  TECHNIQUE: Serial axial images with a collimation of 5 mm were obtained from the  skull base through the vertex  FINDINGS:  The sulci and ventricles are within normal limits for patient age. There is no  evidence of an acute infarction, hemorrhage, or mass-effect. There is no  evidence of midline shift or hydrocephalus. Posterior fossa structures are  unremarkable. No extra-axial collections are seen.  Mastoid air cells are well pneumatized and clear.  There is no evidence of depressed skull fractures of soft tissue swelling.    Impression  No acute intracranial process.  There is no intracranial mass or hemorrhage.    Electronically signed by MALACHI BRICE    Xray Result (most recent):  XR CHEST PORTABLE 09/06/2024    Narrative  EXAM:  XR CHEST PORTABLE    INDICATION: Evaluate for pneumonia, stroke symptoms    COMPARISON: 9/8/2021    TECHNIQUE: 0931 hours portable chest AP view    FINDINGS: The cardiac silhouette is within normal limits. The pulmonary  vasculature is within normal limits.    The lungs and pleural spaces are clear. The visualized bones and upper abdomen  are age-appropriate.    Impression  No acute process on portable chest.      Electronically signed by GABRIELE LOPEZ        Recent Labs     09/05/24  1842 09/06/24  0630    140   K 4.0 4.0    108   CO2 30 29   BUN 17 15   MG 2.2 2.2   PHOS  --  3.3   ALT 20 17     Recent Labs     09/05/24  1842 09/06/24  0630   WBC 5.4 4.2   HGB 12.0 11.6   HCT 37.6 35.4    270     Creatinine (MG/DL)   Date Value   09/06/2024 0.92   09/05/2024 1.04 (H)   04/22/2022 0.97   09/28/2021 0.87       Current meds:    Current Facility-Administered Medications:     levothyroxine (SYNTHROID) tablet 100 mcg, 100 mcg, Oral, Daily, Arlet Koenig MD    sodium chloride flush 0.9 % injection 5-40 mL, 5-40 mL, IntraVENous, 2 times per day, Arlet Koenig MD, 10 mL at 09/06/24 0925    sodium chloride flush 0.9 % injection

## 2024-09-06 NOTE — PROGRESS NOTES
TTE is unremarkable.  MRI brain is negative, minimal CIWM changes  MRA H/N - no stenosis or atherosclerosis.    -Continue Lipitor given high LDL  -Stop ASA  -Stable for d/c  -F/u with PCP and in neurology prn

## 2024-09-06 NOTE — CONSULTS
Neurology Consult Note     NAME: Dina Loar   :  1970   MRN:  [id number]   DATE:   2024       HPI:  Pt is a 53yo RH female admitted 24 with c/o dizziness with head movement, left CP lasting seconds, heavy feeling in left face, globally weak, and chronic intermittent N/T in left arm and leg. /94. CTH neg. MRI brain, MRA H/N pending. TTE pending. EKG NSR. HgbA1C 5.3, .4. She is seen with her  at the bedside.  Yesterday morning the patient reports that she just felt dizzy a little bit like the room was moving her she was, was foggy, just \"out of sorts\" she was able to drive her daughter to school and on the way there had a sudden stabbing left chest sharp pain that was very brief.  She then drove herself to work had coffee was able to go on a walk everything seemed fine but later on she felt spinning dizziness while in the bathroom so she went to her health services at her work and everything checked out fine she returned and completed her day.  When she was driving home she again felt dizzy like lightheaded foggy and then had very brief left arm hand and leg numbness.  This numbness is not new it happens intermittently for quite some time but she has never had it in association with the dizziness.  She denies any prior history of vertigo, no history of CVA, no HTN, no HLD, no DM, no CHRISTINE, no A-fib, no smoking, no APT/OAC at home.  She is still feeling a little lightheaded today.  Patient is taking Zepbound to help with weight loss and recently decided to try to taper off of it on her own, and is only giving herself an injection every other week instead of weekly.  Her PO intake has been decreased while using this medication, notes she is not drinking enough water.     In review of EMR, pt previously seen by me 5/3/19 and 3/8/22 for  mass is identified. Cavernous sinuses are symmetric. The mastoid  air cells and are well pneumatized and clear.    Impression  Normal MRI of the brain.  No intracranial mass, hemorrhage or evidence of acute infarction.    CT Result (most recent):  CT HEAD WO CONTRAST 09/05/2024    Narrative  EXAM: CT HEAD WO CONTRAST    CLINICAL HISTORY: dizziness  INDICATION: dizziness  COMPARISON: 2022.  CT dose reduction was achieved through use of a standardized protocol tailored  for this examination and automatic exposure control for dose modulation.  TECHNIQUE: Serial axial images with a collimation of 5 mm were obtained from the  skull base through the vertex  FINDINGS:  The sulci and ventricles are within normal limits for patient age. There is no  evidence of an acute infarction, hemorrhage, or mass-effect. There is no  evidence of midline shift or hydrocephalus. Posterior fossa structures are  unremarkable. No extra-axial collections are seen.  Mastoid air cells are well pneumatized and clear.  There is no evidence of depressed skull fractures of soft tissue swelling.    Impression  No acute intracranial process.  There is no intracranial mass or hemorrhage.    Electronically signed by MALACHI HABIB          Assessment and Plan:   Pt is a 53yo RH female with no known stroke risk factors, but does have an LDL of 141.4, admitted 9/5/24 with c/o intermittent dizziness, endorsing both spinning dizziness and lightheadedness, and feeling foggy throughout the day yesterday, also had brief left CP, feeling globally weak, and an episode of her chronic intermittent N/T in left arm and leg that occurred in conjunction with the dizziness yesterday. Her PO intake has been decreased while using Zepbound, notes she is not drinking enough water. /94. CTH neg. EKG NSR. HgbA1C 5.3.   Exam with right exophthalmos, o/w unremarkable.    Low suspicion for concerning neurological etiology, but cannot exclude TIA/CVA. Possible

## 2024-09-06 NOTE — NURSE NAVIGATOR
Neuroscience Care Navigation Note    Patient Overview    The patient is a 54 y.o. female who presents with dizziness and chest pain. The dizziness worsens with head movement, and the chest pain is pressure-like, localized to the left side, with a severity of 4/10, without radiation or known aggravating or relieving factors. She has no history of coronary artery disease, transient ischemic attack (TIA), or stroke, but reports associated numbness in her left upper and lower extremities. A CT of the head showed no acute pathology.    Past Medical History: Hypothyroidism, Vitamin D deficiency    Past Surgical History: History reviewed. No pertinent surgical history.    Assessment    Diagnosis Understanding: The patient fully understands their diagnosis, treatment plan, and warning signs, is actively engaged in their care, and has no significant barriers to comprehension or self-management.    Medication Adherence: The patient fully understands their medication regimen, takes all prescribed medications as directed, and has no barriers to adherence.    Mobility/Functional Status: The patient is able to transfer to chair utilizing no assistive devices. The patient is not at risk for falls at this time.    Pain Management: The patient appears to not have any pain at this time.    Emotional Well-being: The patient reports feeling excited to be discharged home today.    Support System: The patient's support system includes Spouse/Partner    Discharge Readiness: The patient has plans to be discharged to Home. According to the patient, they feel confident and prepared to manage care.    Social Determinants of Health     Tobacco Use: Low Risk  (9/5/2024)    Patient History     Smoking Tobacco Use: Never     Smokeless Tobacco Use: Never     Passive Exposure: Not on file   Alcohol Use: Not At Risk (9/6/2024)    AUDIT-C     Frequency of Alcohol Consumption: Never     Average Number of Drinks: Patient does not drink     Frequency of  No     Physically Abused: No     Sexually Abused: No   Utilities: Not At Risk (9/6/2024)    [facility] Utilities     Threatened with loss of utilities: No     Risk of Unplanned Readmission:  0     Education    The Nurse Navigator provided patient-specific educational materials based on their diagnosis of dizziness, including information on their treatment plan, symptom management strategies, and modifiable risk factors of  diet .    Plan    Patient is eager to get MRI to help find some answers to what caused her symptoms. Will reach out to MRI team to determine when she will be able to go down for her MRI.  The Nurse Navigator will continue to provide ongoing support and follow-up as needed.      Total time assessing the patient, discussing their specific diagnosis, providing pertinent education, reviewing the treatment plan, and offering care navigation support: 10 minutes.

## 2024-09-06 NOTE — H&P
History and Physical    Date of Service:  9/6/2024  Primary Care Provider: Hung Benoit MD  Source of information: The patient and review of EMR    Chief Complaint: Chest Pain and Dizziness      History of Presenting Illness:   Dina Lora is a 54 y.o. female with past medical history significant for hypothyroidism presented at [facility] emergency room at [facility] with dizziness and chest pain.  Patient symptoms started on the day of her presentation at the emergency room.  The dizziness is worse when the patient moves her head.  The chest pain is located at the left side of the chest, pressure-like, no radiation, no known aggravating or relieving factors, 4/10 in severity.  Patient has no history of coronary artery disease.  The dizziness is saturated with left upper and lower extremity numbness.  She has no prior history of a TIA or CVA.  CT of the head without contrast was done on arrival at the emergency room which did not show acute pathology.  She was referred to the hospitalist service for admission     REVIEW OF SYSTEMS:  REVIEW OF SYSTEMS:  HEAD, EYES, EARS, NOSE AND THROAT:  No headache.  Positive for dizziness .  No blurring of vision.  No photophobia.  RESPIRATORY SYSTEM:  No shortness of breath.  No cough.  No hemoptysis.  CARDIOVASCULAR SYSTEM: Positive for chest pain .  No orthopnea.  No palpitations.  GASTROINTESTINAL SYSTEM:  No nausea or vomiting.  No diarrhea.  No constipation.  GENITOURINARY SYSTEM:  No dysuria, no urgency, and no frequency.     All other systems are reviewed and they are negative.        Past Medical History:   Diagnosis Date    Hypothyroid     Vitamin D deficiency       History reviewed. No pertinent surgical history.  Prior to Admission medications    Medication Sig Start Date End Date Taking? Authorizing Provider   ZEPBOUND 5 MG/0.5ML SOAJ inject 5mg SUBCUTANEOUSLY ONCE WEEKLY 8/9/24  Yes Provider, MD Hannah   UNITHROID 100 MCG tablet TAKE  Nitrostat        DIET: ADULT DIET; Regular; Low Fat/Low Chol/High Fiber/JEROME   ISOLATION PRECAUTIONS: No active isolations  CODE STATUS: Full Code   Central Line:   None  DVT PROPHYLAXIS: Lovenox  FUNCTIONAL STATUS PRIOR TO HOSPITALIZATION: Fully active and ambulatory; able to carry on all self-care without restriction.  Ambulatory status/function: By self   EARLY MOBILITY ASSESSMENT: Recommend routine ambulation while hospitalized with the assistance of nursing staff  ANTICIPATED DISCHARGE: Greater than 48 hours.  ANTICIPATED DISPOSITION: Home  EMERGENCY CONTACT/SURROGATE DECISION MAKER: Jass Lora, spouse    CRITICAL CARE WAS PERFORMED FOR THIS ENCOUNTER: NO.      Signed By: Arlet Koenig MD     September 6, 2024         Please note that this dictation may have been completed with Dragon, the computer voice recognition software.  Quite often unanticipated grammatical, syntax, homophones, and other interpretive errors are inadvertently transcribed by the computer software.  Please disregard these errors.  Please excuse any errors that have escaped final proofreading.

## 2024-09-06 NOTE — PROGRESS NOTES
[facility] Adult  Hospitalist Group                                                                                          Hospitalist Progress Note  Marilia Worley PA-C  Answering service: [phone] OR [phone] from in house phone        Date of Service:  2024  NAME:  Dina Lora  :  1970  MRN:  [id number]       Admission Summary:     Dina Lora is a 54 y.o. female with past medical history significant for hypothyroidism presented at [facility] emergency room at [facility] with dizziness and chest pain.  Patient symptoms started on the day of her presentation at the emergency room.  The dizziness is worse when the patient moves her head.  The chest pain is located at the left side of the chest, pressure-like, no radiation, no known aggravating or relieving factors, 4/10 in severity.  Patient has no history of coronary artery disease.  The dizziness is saturated with left upper and lower extremity numbness.  She has no prior history of a TIA or CVA.  CT of the head without contrast was done on arrival at the emergency room which did not show acute pathology.  She was referred to the hospitalist service for admission       Interval history / Subjective:     F/u dizziness, now resolved   Patient states her chest pain lasted only a few seconds before fully resolving and has not returned since. Notes she feels, \"out of sorts,\" but is unable to explain this to me in more detail. Does deny dizziness, lightheadedness, chest pain, sob, abd pain, palpitations, nausea/vomiting.      Assessment & Plan:     Acute CVA POA  Dizziness -- resolved   - MRI/MRA of the brain ordered   - MRA of the neck ordered  - TTE ordered    - , Cholesterol 220   - A1c level 5.3%  - C-reactive protein 0.34  - B12 and folate levels WNR  - Neurology consult   - Start the patient on aspirin and Lipitor.     Acquired hypothyroidism POA  - Continue Synthroid   - TSH level WNR      Acute chest  pain POA  -- resolved   - Trop 5 -> 4  - D-dimer WNR   - lipase WNR  - urine drug screen pending   - Cardiology consulted   - CXR ordered           Code status: FULL  Prophylaxis: Lovenox  Care Plan discussed with: pt, nurse  Anticipated Disposition: home pending scans and consults, likely tomorrow   Observation  Cardiac monitoring: Remote Telemetry     Principal Problem:    Acute CVA (cerebrovascular accident) (HCC)  Active Problems:    TIA (transient ischemic attack)  Resolved Problems:    * No resolved hospital problems. *         Social Determinants of Health     Tobacco Use: Low Risk  (9/5/2024)    Patient History     Smoking Tobacco Use: Never     Smokeless Tobacco Use: Never     Passive Exposure: Not on file   Alcohol Use: Not At Risk (9/5/2024)    AUDIT-C     Frequency of Alcohol Consumption: Never     Average Number of Drinks: Patient does not drink     Frequency of Binge Drinking: Never   Financial Resource Strain: Not on file   Food Insecurity: Not on file   Transportation Needs: Not on file   Physical Activity: Not on file   Stress: Not on file   Social Connections: Not on file   Intimate Partner Violence: Not on file   Depression: Not on file   Housing Stability: Not on file   Interpersonal Safety: Not At Risk (9/5/2024)    Interpersonal Safety Domain Source: IP Abuse Screening     Physical abuse: Denies     Verbal abuse: Denies     Emotional abuse: Denies     Financial abuse: Denies     Sexual abuse: Denies   Utilities: Not on file       Review of Systems:   Pertinent items are noted in HPI.       Vital Signs:    Last 24hrs VS reviewed since prior progress note. Most recent are:  Vitals:    09/06/24 0558   BP:    Pulse: 62   Resp:    Temp:    SpO2:        No intake or output data in the 24 hours ending 09/06/24 0947     Physical Examination:     I had a face to face encounter with this patient and independently examined them on 9/6/2024 as outlined below:          General : alert x 3, awake, no acute

## 2024-09-06 NOTE — ED NOTES
Patient and family updated with plan of care: Admission to [facility] - Awaiting Room assignment by nursing supervisor prior to transport.    Patient remains on cardiac monitor showing NSR, NIBP Q 1 hour, SPO2 WNL on room air. Bed is in low and locked position, side rails up x2,  call bell within reach. No further needs/ wants expressed at this time.

## 2024-09-06 NOTE — PROGRESS NOTES
PHYSICAL THERAPY EVALUATION/DISCHARGE    Patient: Dina Lora (54 y.o. female)  Date: 9/6/2024  Primary Diagnosis: TIA (transient ischemic attack) [G45.9]       Precautions:                        ASSESSMENT AND RECOMMENDATIONS:  Based on the objective data below, the patient is at her functional baseline s/p admission with complaints of dizziness. CT negative, MRI pending. Prior to admission patient was independent. Today, she completed all mobility independently with vitals stable and no reports of dizziness. She reports feeling like she is at her baseline for mobility and declined offer for stair training. Patient was educated on BEFAST and fall prevention. She expressed understanding. Given independent level, acute PT will sign off at this time.    Functional Outcome Measure:  The patient scored 56/56 on the Stevens Balance Scale outcome measure which is indicative of low fall risk.        Further skilled acute physical therapy is not indicated at this time.       PLAN :  Recommendation for discharge: (in order for the patient to meet his/her long term goals):   No skilled physical therapy    Other factors to consider for discharge: no additional factors    IF patient discharges home will need the following DME: none       SUBJECTIVE:   Patient stated “I'm not really dizzy today.”    OBJECTIVE DATA SUMMARY:     Past Medical History:   Diagnosis Date    Hypothyroid     Vitamin D deficiency    History reviewed. No pertinent surgical history.    Home Situation and Prior Level of Function:   Social/Functional History  Lives With: Spouse, Family  Type of Home: House  Home Layout: One level  Home Access: Stairs to enter with rails  Entrance Stairs - Number of Steps: 5  Entrance Stairs - Rails: Left  Bathroom Shower/Tub: Walk-in shower  Bathroom Equipment: None  Home Equipment: None  Has the patient had two or more falls in the past year or any fall with injury in the past year?: No  ADL Assistance:  nurse    Patient Education  Education Given To: Patient  Education Provided: Role of Therapy;Plan of Care  Education Provided Comments: BEFAST  Education Method: Verbal;Printed Information/Hand-outs  Barriers to Learning: None  Education Outcome: Verbalized understanding    Thank you for this referral.  Kenia Houston, PT  Minutes: 24      Physical Therapy Evaluation Charge Determination   History Examination Presentation Decision-Making   LOW Complexity : Zero comorbidities / personal factors that will impact the outcome / POC MEDIUM Complexity : 3 Standardized tests and measures addressin body structure, function, activity limitation and / or participation in recreation  LOW Complexity : Stable, uncomplicated  Stevens Balance Test  LOW      Based on the above components, the patient evaluation is determined to be of the following complexity level: Low

## 2024-09-06 NOTE — ED NOTES
Patient updated with plan of care: Admission to [facility] Room 652. Transport ETA ~ 20 min.

## 2024-09-06 NOTE — DISCHARGE INSTRUCTIONS
Discharge Instructions       PATIENT ID: Dina Lora  MRN: [id number]   YOB: 1970    DATE OF ADMISSION: 9/5/2024   DATE OF DISCHARGE: 9/6/2024    PRIMARY CARE PROVIDER: Hung Benoit     ATTENDING PHYSICIAN: Stevenson Santillan MD   DISCHARGING PROVIDER: Marilia Worley PA-C    To contact this individual call [phone] and ask the  to page.   If unavailable ask to be transferred the Adult Hospitalist Department.    DISCHARGE DIAGNOSES     Acute CVA POA - ruled out   Dizziness -- resolved   - MRI/MRA of the brain unremarkable   - MRA of the neck unremarkable  - TTE EF 60-65% no PFO or thrombus     - , Cholesterol 220   - A1c level 5.3%  - C-reactive protein 0.34  - B12 and folate levels WNR  - Neurology consult, cleared for dc today  - Start the patient on aspirin and Lipitor. -- per neuro, dc on lipitor, no need for ASA      Acquired hypothyroidism POA  - Continue Synthroid   - TSH level WNR      Acute chest pain POA  -- resolved   - Trop 5 -> 4  - D-dimer WNR   - lipase WNR  - urine drug screen pending   - Cardiology consulted, cleared for dc today  - CXR with acute progress      CONSULTATIONS: [unfilled]    PROCEDURES/SURGERIES: * No surgery found *    PENDING TEST RESULTS:   At the time of discharge the following test results are still pending: n/a    FOLLOW UP APPOINTMENTS:   [unfilled]       ADDITIONAL CARE RECOMMENDATIONS:   Continue Lipitor and ASA  Follow up with neurology   Meclizine as needed for dizziness. Caution as this medication can cause drowsiness.  Return to ED if you develop fever/chills, weakness, numbness/tingling, difficulty with speech, vision changes     DIET: regular diet    ACTIVITY: activity as tolerated    WOUND CARE: x    EQUIPMENT needed: x      DISCHARGE MEDICATIONS:   See Medication Reconciliation Form    It is important that you take the medication exactly as they are prescribed.   Keep your medication in the bottles provided by the pharmacist  and keep a list of the medication names, dosages, and times to be taken in your wallet.   Do not take other medications without consulting your doctor.       NOTIFY YOUR PHYSICIAN FOR ANY OF THE FOLLOWING:   Fever over 101 degrees for 24 hours.   Chest pain, shortness of breath, fever, chills, nausea, vomiting, diarrhea, change in mentation, falling, weakness, bleeding. Severe pain or pain not relieved by medications.  Or, any other signs or symptoms that you may have questions about.      DISPOSITION:   x Home With:   OT  PT  HH  RN       SNF/Inpatient Rehab/LTAC    Independent/assisted living    Hospice    Other:     CDMP Checked:   Yes x     PROBLEM LIST Updated:  Yes x       Signed:   Marilia Worley PA-C  9/6/2024  4:45 PM

## 2024-09-12 NOTE — PROGRESS NOTES
Physician Progress Note      PATIENT:               SAMIA CARUSO  [facility] #:                  [id number]  :                       1970  ADMIT DATE:       2024 6:05 PM  DISCH DATE:        2024 6:56 PM  RESPONDING  PROVIDER #:        Marilia Worley PA-C          QUERY TEXT:    Patient admitted with dizziness and chest pain. noted to have TIA. If   possible, please document in progress notes and discharge summary after study   the etiology of the Dizziness:    The medical record reflects the following:  Risk Factors: TIA, is on home medications,  Clinical Indicators: Consults on -Low suspicion for concerning   neurological etiology, but cannot exclude TIA/CVA, Possible   metabolic/medication related, such as due to fluctuation in Zepbound levels   with qoweek dosing or decreased PO intake.  DS  Acute CVA POA - ruled out, Dizziness -- resolved. TIA mentioned under   resolved problems.  MRA  No acute intracranial abnormality.  Treatment: Continue Lipitor 40mg daily, Oral ASA 81mg, Neurology consulted,   Imaging studies, serial labs.    Thanks, and regards,  KEYUR Waters, CDS  Options provided:  -- Dizziness due to TIA  -- Dizziness related to Zepbound medication.  -- Other - I will add my own diagnosis  -- Disagree - Not applicable / Not valid  -- Disagree - Clinically unable to determine / Unknown  -- Refer to Clinical Documentation Reviewer    PROVIDER RESPONSE TEXT:    The Dizziness is due to TIA.    Query created by: Gloria Pacheco on 2024 6:08 AM      Electronically signed by:  Marilia Worley PA-C 2024 7:14 AM

## 2025-03-05 DIAGNOSIS — E03.9 HYPOTHYROIDISM, UNSPECIFIED: ICD-10-CM

## 2025-03-05 RX ORDER — LEVOTHYROXINE SODIUM 100 UG/1
TABLET ORAL
Qty: 90 TABLET | Refills: 0 | OUTPATIENT
Start: 2025-03-05

## 2025-03-06 ENCOUNTER — OFFICE VISIT (OUTPATIENT)
Age: 55
End: 2025-03-06
Payer: COMMERCIAL

## 2025-03-06 VITALS
HEART RATE: 65 BPM | WEIGHT: 161 LBS | SYSTOLIC BLOOD PRESSURE: 134 MMHG | DIASTOLIC BLOOD PRESSURE: 77 MMHG | HEIGHT: 65 IN | BODY MASS INDEX: 26.82 KG/M2

## 2025-03-06 DIAGNOSIS — E03.9 ACQUIRED HYPOTHYROIDISM: Primary | ICD-10-CM

## 2025-03-06 DIAGNOSIS — R63.4 WEIGHT LOSS: ICD-10-CM

## 2025-03-06 PROCEDURE — 99214 OFFICE O/P EST MOD 30 MIN: CPT | Performed by: INTERNAL MEDICINE

## 2025-03-06 PROCEDURE — G2211 COMPLEX E/M VISIT ADD ON: HCPCS | Performed by: INTERNAL MEDICINE

## 2025-03-06 NOTE — PROGRESS NOTES
Chief Complaint   Patient presents with    Thyroid Problem       * Since Last Visit: - 11/2/2023      No new med issues  Lost weight on zepbound  30+ pounds -- takes every other week   New cholesterol med     On 100mcg   On brand, takes correctly, no missed doses    No hyper- symptoms     Unable to do gluten free  Breath test for H pylori was positive - did treatment; did not repeat the test     General:  From initial visit 9/1/2022   Lots of fluctuation in TSH levels and dosing  Saw thyroid eye disease specialist and ruled out thyroid as cause of issues  Lots of pins/needles in fingers, left arm hurts a lot     Dx hypothyroidism in 90s  Currently on 125mcg -- for 5 months -dose was higher   On generic  Was on synthroid in past   Prefers brand   Takes in AM with water, eats 2 hours  No Fe/Ca/MTV  Is in menopause - no other estrogen changes  No change in diet like less processed food   Has had weight shifts 20lb  Current wt based is 132, lowest weight was 120mcg     Thyroid Symptoms  denies change in energy, **has weight loss**, denies change in appetite, denies sleep issues, denies hair changes, no skin changes, denies sweats, denies hot/cold intolerance, denies tremor, denies palpitations, denies change in bowels, no change in menses, denies mood changes    Neck Symptoms  denies dysphagia, denies anterior neck pain, denies neck swelling, notes no nodules    Labs/Studies    3/23/12 TSH 0.165, FT4 0.8, FT3 2.0  9/7/12 TSH 1.0, FT4 0.9, FT3 2.4  11/11/13 TSH 41, FT4 0.2, FT3 0.8  5/2/14 TSH 0.13, FT4 1.57, FT3 2.9  9/19/14 TSH 0.05, FT4 1.41, FT 2.5  6/12/15 TSH 3.9, FT4 1.3, FT3 2.5  11/13/15 TSH 6.2  3/18/16 TSH 0.6  10/20/17 TSH 3.4  11/23/18 TSH 0.11  1/18/19 TSH 0.012, FT4 2.0, FT3 3.2  5/3/19 TSH 0.25, FT4 1.36, FT3 2.6  10/18/10 TSH 29.7  12/30/10 TSH 5.5  7/31/20 TSH 22.4  11/13/20 TSH 0.012  5/13/21 TSH 1.5  10/1/21 TSH 0.013  3/9/22 TSH 0.35  7/1/22 TSH 0.094, FT4 1.62    Lab Results   Component Value

## 2025-03-08 LAB
T4 FREE SERPL-MCNC: 0.57 NG/DL (ref 0.82–1.77)
TSH SERPL DL<=0.005 MIU/L-ACNC: 22.7 UIU/ML (ref 0.45–4.5)

## 2025-03-14 ENCOUNTER — RESULTS FOLLOW-UP (OUTPATIENT)
Age: 55
End: 2025-03-14

## 2025-03-14 DIAGNOSIS — E03.9 ACQUIRED HYPOTHYROIDISM: Primary | ICD-10-CM

## 2025-03-14 DIAGNOSIS — E03.9 HYPOTHYROIDISM, UNSPECIFIED: ICD-10-CM

## 2025-03-14 RX ORDER — LEVOTHYROXINE SODIUM 100 UG/1
TABLET ORAL
Qty: 90 TABLET | Refills: 3 | Status: SHIPPED | OUTPATIENT
Start: 2025-03-14

## 2025-04-25 DIAGNOSIS — E03.9 ACQUIRED HYPOTHYROIDISM: ICD-10-CM
